# Patient Record
Sex: MALE | Race: WHITE | NOT HISPANIC OR LATINO | Employment: OTHER | URBAN - METROPOLITAN AREA
[De-identification: names, ages, dates, MRNs, and addresses within clinical notes are randomized per-mention and may not be internally consistent; named-entity substitution may affect disease eponyms.]

---

## 2023-03-28 DIAGNOSIS — Z79.4 TYPE 2 DIABETES MELLITUS WITH OTHER SPECIFIED COMPLICATION, WITH LONG-TERM CURRENT USE OF INSULIN (MULTI): Primary | ICD-10-CM

## 2023-03-28 DIAGNOSIS — E11.69 TYPE 2 DIABETES MELLITUS WITH OTHER SPECIFIED COMPLICATION, WITH LONG-TERM CURRENT USE OF INSULIN (MULTI): Primary | ICD-10-CM

## 2023-03-28 RX ORDER — METFORMIN HYDROCHLORIDE 500 MG/1
500 TABLET, EXTENDED RELEASE ORAL DAILY
COMMUNITY
End: 2023-03-28 | Stop reason: SDUPTHER

## 2023-03-28 RX ORDER — METFORMIN HYDROCHLORIDE 500 MG/1
500 TABLET, EXTENDED RELEASE ORAL DAILY
Qty: 90 TABLET | Refills: 3 | Status: SHIPPED | OUTPATIENT
Start: 2023-03-28 | End: 2024-03-22 | Stop reason: SDUPTHER

## 2023-04-13 PROBLEM — L40.9 PSORIASIS: Status: ACTIVE | Noted: 2023-04-13

## 2023-04-13 PROBLEM — I48.0 PAROXYSMAL ATRIAL FIBRILLATION (MULTI): Status: ACTIVE | Noted: 2023-04-13

## 2023-04-13 PROBLEM — I25.10 CORONARY ARTERY DISEASE INVOLVING NATIVE CORONARY ARTERY OF NATIVE HEART WITHOUT ANGINA PECTORIS: Status: ACTIVE | Noted: 2023-04-13

## 2023-04-13 PROBLEM — R80.9: Status: ACTIVE | Noted: 2023-04-13

## 2023-04-13 PROBLEM — E78.5 HYPERLIPIDEMIA: Status: ACTIVE | Noted: 2023-04-13

## 2023-04-13 PROBLEM — E08.29: Status: ACTIVE | Noted: 2023-04-13

## 2023-04-13 PROBLEM — H93.19 TINNITUS: Status: ACTIVE | Noted: 2023-04-13

## 2023-04-13 PROBLEM — L02.212 BACK ABSCESS: Status: ACTIVE | Noted: 2023-04-13

## 2023-04-13 PROBLEM — N40.0 BENIGN PROSTATIC HYPERPLASIA: Status: ACTIVE | Noted: 2023-04-13

## 2023-04-13 PROBLEM — J32.9 SINUS INFECTION: Status: ACTIVE | Noted: 2023-04-13

## 2023-04-13 PROBLEM — I10 ESSENTIAL HYPERTENSION: Status: ACTIVE | Noted: 2023-04-13

## 2023-04-13 PROBLEM — R19.5 POSITIVE COLORECTAL CANCER SCREENING USING COLOGUARD TEST: Status: ACTIVE | Noted: 2023-04-13

## 2023-04-13 PROBLEM — E87.6 HYPOKALEMIA: Status: ACTIVE | Noted: 2023-04-13

## 2023-04-13 PROBLEM — H53.139 SUDDEN LOSS OF VISION: Status: ACTIVE | Noted: 2023-04-13

## 2023-04-13 PROBLEM — E78.9 DISORDER OF LIPOID METABOLISM: Status: ACTIVE | Noted: 2023-04-13

## 2023-04-13 PROBLEM — H90.3 SENSORINEURAL HEARING LOSS, BILATERAL: Status: ACTIVE | Noted: 2023-04-13

## 2023-04-13 RX ORDER — HYDROXYCHLOROQUINE SULFATE 200 MG/1
TABLET, FILM COATED ORAL 2 TIMES DAILY
COMMUNITY

## 2023-04-13 RX ORDER — APIXABAN 5 MG/1
1 TABLET, FILM COATED ORAL 2 TIMES DAILY
COMMUNITY
Start: 2020-05-18 | End: 2023-12-12 | Stop reason: SDUPTHER

## 2023-04-13 RX ORDER — DOCUSATE SODIUM 100 MG/1
1 CAPSULE, LIQUID FILLED ORAL 2 TIMES DAILY
COMMUNITY

## 2023-04-13 RX ORDER — FINASTERIDE 5 MG/1
1 TABLET, FILM COATED ORAL DAILY
COMMUNITY
Start: 2019-05-17 | End: 2023-05-25 | Stop reason: SDUPTHER

## 2023-04-13 RX ORDER — CLOBETASOL PROPIONATE 0.5 MG/G
CREAM TOPICAL
COMMUNITY

## 2023-04-13 RX ORDER — SIMVASTATIN 40 MG/1
1 TABLET, FILM COATED ORAL NIGHTLY
COMMUNITY
Start: 2019-04-23 | End: 2024-02-20 | Stop reason: SDUPTHER

## 2023-04-13 RX ORDER — METOPROLOL SUCCINATE 25 MG/1
1 TABLET, EXTENDED RELEASE ORAL DAILY
COMMUNITY
Start: 2019-12-06 | End: 2023-12-29 | Stop reason: SDUPTHER

## 2023-04-13 RX ORDER — CLOBETASOL PROPIONATE 0.5 MG/ML
LOTION TOPICAL
COMMUNITY
Start: 2022-04-20 | End: 2023-08-11 | Stop reason: SDUPTHER

## 2023-04-13 RX ORDER — ACETAMINOPHEN 325 MG/1
TABLET ORAL EVERY 6 HOURS
COMMUNITY

## 2023-04-13 RX ORDER — NAPROXEN SODIUM 220 MG/1
TABLET, FILM COATED ORAL
COMMUNITY

## 2023-04-13 RX ORDER — TAMSULOSIN HYDROCHLORIDE 0.4 MG/1
0.4 CAPSULE ORAL 2 TIMES DAILY
COMMUNITY
End: 2023-08-11 | Stop reason: SDUPTHER

## 2023-04-13 RX ORDER — LOSARTAN POTASSIUM AND HYDROCHLOROTHIAZIDE 25; 100 MG/1; MG/1
1 TABLET ORAL DAILY
COMMUNITY
Start: 2019-05-17 | End: 2024-03-25

## 2023-04-17 ENCOUNTER — LAB (OUTPATIENT)
Dept: LAB | Facility: LAB | Age: 83
End: 2023-04-17
Payer: MEDICARE

## 2023-04-17 ENCOUNTER — OFFICE VISIT (OUTPATIENT)
Dept: PRIMARY CARE | Facility: CLINIC | Age: 83
End: 2023-04-17
Payer: MEDICARE

## 2023-04-17 VITALS
HEIGHT: 67 IN | BODY MASS INDEX: 28.25 KG/M2 | SYSTOLIC BLOOD PRESSURE: 122 MMHG | WEIGHT: 180 LBS | DIASTOLIC BLOOD PRESSURE: 64 MMHG

## 2023-04-17 DIAGNOSIS — N40.1 BENIGN PROSTATIC HYPERPLASIA WITH URINARY FREQUENCY: ICD-10-CM

## 2023-04-17 DIAGNOSIS — Z00.00 ROUTINE GENERAL MEDICAL EXAMINATION AT HEALTH CARE FACILITY: Primary | ICD-10-CM

## 2023-04-17 DIAGNOSIS — M05.79 RHEUMATOID ARTHRITIS INVOLVING MULTIPLE SITES WITH POSITIVE RHEUMATOID FACTOR (MULTI): ICD-10-CM

## 2023-04-17 DIAGNOSIS — R80.9: ICD-10-CM

## 2023-04-17 DIAGNOSIS — I48.0 PAROXYSMAL ATRIAL FIBRILLATION (MULTI): ICD-10-CM

## 2023-04-17 DIAGNOSIS — H90.3 SENSORINEURAL HEARING LOSS, BILATERAL: ICD-10-CM

## 2023-04-17 DIAGNOSIS — I73.9 PVD (PERIPHERAL VASCULAR DISEASE) (CMS-HCC): ICD-10-CM

## 2023-04-17 DIAGNOSIS — N40.1 BENIGN PROSTATIC HYPERPLASIA WITH LOWER URINARY TRACT SYMPTOMS, SYMPTOM DETAILS UNSPECIFIED: Primary | ICD-10-CM

## 2023-04-17 DIAGNOSIS — I25.10 CORONARY ARTERY DISEASE INVOLVING NATIVE CORONARY ARTERY OF NATIVE HEART WITHOUT ANGINA PECTORIS: ICD-10-CM

## 2023-04-17 DIAGNOSIS — R35.0 BENIGN PROSTATIC HYPERPLASIA WITH URINARY FREQUENCY: ICD-10-CM

## 2023-04-17 DIAGNOSIS — H93.13 TINNITUS OF BOTH EARS: ICD-10-CM

## 2023-04-17 DIAGNOSIS — E08.29: ICD-10-CM

## 2023-04-17 DIAGNOSIS — E78.41 ELEVATED LIPOPROTEIN(A): ICD-10-CM

## 2023-04-17 DIAGNOSIS — I10 ESSENTIAL HYPERTENSION: ICD-10-CM

## 2023-04-17 DIAGNOSIS — L40.9 PSORIASIS: ICD-10-CM

## 2023-04-17 DIAGNOSIS — E78.9 DISORDER OF LIPOID METABOLISM: ICD-10-CM

## 2023-04-17 LAB — PROSTATE SPECIFIC AG (NG/ML) IN SER/PLAS: 0.52 NG/ML (ref 0–4)

## 2023-04-17 PROCEDURE — G0439 PPPS, SUBSEQ VISIT: HCPCS | Performed by: FAMILY MEDICINE

## 2023-04-17 PROCEDURE — 84153 ASSAY OF PSA TOTAL: CPT

## 2023-04-17 PROCEDURE — 3078F DIAST BP <80 MM HG: CPT | Performed by: FAMILY MEDICINE

## 2023-04-17 PROCEDURE — 1160F RVW MEDS BY RX/DR IN RCRD: CPT | Performed by: FAMILY MEDICINE

## 2023-04-17 PROCEDURE — 3074F SYST BP LT 130 MM HG: CPT | Performed by: FAMILY MEDICINE

## 2023-04-17 PROCEDURE — 36415 COLL VENOUS BLD VENIPUNCTURE: CPT

## 2023-04-17 PROCEDURE — 1157F ADVNC CARE PLAN IN RCRD: CPT | Performed by: FAMILY MEDICINE

## 2023-04-17 PROCEDURE — 1159F MED LIST DOCD IN RCRD: CPT | Performed by: FAMILY MEDICINE

## 2023-04-17 RX ORDER — DEXTROSE 4 G
TABLET,CHEWABLE ORAL
Qty: 1 EACH | Refills: 0 | Status: SHIPPED | OUTPATIENT
Start: 2023-04-17 | End: 2024-04-16

## 2023-04-17 ASSESSMENT — ENCOUNTER SYMPTOMS
RESPIRATORY NEGATIVE: 1
WEAKNESS: 1
JOINT SWELLING: 1
ARTHRALGIAS: 1
ENDOCRINE NEGATIVE: 1
HEMATOLOGIC/LYMPHATIC NEGATIVE: 1
CONSTITUTIONAL NEGATIVE: 1
ALLERGIC/IMMUNOLOGIC NEGATIVE: 1
PSYCHIATRIC NEGATIVE: 1
FREQUENCY: 1
CARDIOVASCULAR NEGATIVE: 1
GASTROINTESTINAL NEGATIVE: 1

## 2023-04-17 NOTE — PATIENT INSTRUCTIONS
Contd meds , diet , daily X's FUWOD's . Oph/ pod, Labs reviewed, tcb  x 1wk  , rto x 4m had all shots ,

## 2023-04-17 NOTE — PROGRESS NOTES
"Subjective   Reason for Visit: Artis Horowitz is an 82 y.o. male here for a Medicare Wellness visit.          Reviewed all medications by prescribing practitioner or clinical pharmacist (such as prescriptions, OTCs, herbal therapies and supplements) and documented in the medical record.    RO        Patient Care Team:  Susan Valera MD as PCP - General  Susan Valera MD as PCP - [facility] Attributed Provider     Review of Systems   Constitutional: Negative.    HENT:  Positive for hearing loss and postnasal drip.    Eyes:  Positive for visual disturbance.   Respiratory: Negative.     Cardiovascular: Negative.    Gastrointestinal: Negative.    Endocrine: Negative.    Genitourinary:  Positive for frequency and urgency.   Musculoskeletal:  Positive for arthralgias and joint swelling.   Skin:  Positive for rash (psorasis).   Allergic/Immunologic: Negative.    Neurological:  Positive for weakness.   Hematological: Negative.    Psychiatric/Behavioral: Negative.         Objective   Vitals:  /64   Ht 1.702 m (5' 7\")   Wt 81.6 kg (180 lb)   BMI 28.19 kg/m²           Assessment/Plan   Problem List Items Addressed This Visit    None  Visit Diagnoses       Routine general medical examination at health care facility    -  Primary                 "

## 2023-04-21 RX ORDER — TAMSULOSIN HYDROCHLORIDE 0.4 MG/1
0.4 CAPSULE ORAL DAILY
Qty: 30 CAPSULE | Refills: 11 | Status: SHIPPED | OUTPATIENT
Start: 2023-04-21 | End: 2024-03-22 | Stop reason: SDUPTHER

## 2023-05-25 DIAGNOSIS — N40.0 BENIGN PROSTATIC HYPERPLASIA, UNSPECIFIED WHETHER LOWER URINARY TRACT SYMPTOMS PRESENT: ICD-10-CM

## 2023-05-25 RX ORDER — FINASTERIDE 5 MG/1
5 TABLET, FILM COATED ORAL DAILY
Qty: 90 TABLET | Refills: 3 | Status: SHIPPED | OUTPATIENT
Start: 2023-05-25 | End: 2024-05-21 | Stop reason: SDUPTHER

## 2023-08-07 LAB
ALANINE AMINOTRANSFERASE (SGPT) (U/L) IN SER/PLAS: 9 U/L (ref 10–52)
ALBUMIN (G/DL) IN SER/PLAS: 3.8 G/DL (ref 3.4–5)
ALKALINE PHOSPHATASE (U/L) IN SER/PLAS: 76 U/L (ref 33–136)
ANION GAP IN SER/PLAS: 12 MMOL/L (ref 10–20)
ASPARTATE AMINOTRANSFERASE (SGOT) (U/L) IN SER/PLAS: 12 U/L (ref 9–39)
BILIRUBIN TOTAL (MG/DL) IN SER/PLAS: 0.4 MG/DL (ref 0–1.2)
CALCIUM (MG/DL) IN SER/PLAS: 8.7 MG/DL (ref 8.6–10.3)
CARBON DIOXIDE, TOTAL (MMOL/L) IN SER/PLAS: 28 MMOL/L (ref 21–32)
CHLORIDE (MMOL/L) IN SER/PLAS: 103 MMOL/L (ref 98–107)
CREATININE (MG/DL) IN SER/PLAS: 1.11 MG/DL (ref 0.5–1.3)
ERYTHROCYTE DISTRIBUTION WIDTH (RATIO) BY AUTOMATED COUNT: 12.9 % (ref 11.5–14.5)
ERYTHROCYTE MEAN CORPUSCULAR HEMOGLOBIN CONCENTRATION (G/DL) BY AUTOMATED: 31.1 G/DL (ref 32–36)
ERYTHROCYTE MEAN CORPUSCULAR VOLUME (FL) BY AUTOMATED COUNT: 98 FL (ref 80–100)
ERYTHROCYTES (10*6/UL) IN BLOOD BY AUTOMATED COUNT: 3.76 X10E12/L (ref 4.5–5.9)
GFR MALE: 66 ML/MIN/1.73M2
GLUCOSE (MG/DL) IN SER/PLAS: 115 MG/DL (ref 74–99)
HEMATOCRIT (%) IN BLOOD BY AUTOMATED COUNT: 36.7 % (ref 41–52)
HEMOGLOBIN (G/DL) IN BLOOD: 11.4 G/DL (ref 13.5–17.5)
LEUKOCYTES (10*3/UL) IN BLOOD BY AUTOMATED COUNT: 9 X10E9/L (ref 4.4–11.3)
PLATELETS (10*3/UL) IN BLOOD AUTOMATED COUNT: 246 X10E9/L (ref 150–450)
POTASSIUM (MMOL/L) IN SER/PLAS: 4 MMOL/L (ref 3.5–5.3)
PROTEIN TOTAL: 6.3 G/DL (ref 6.4–8.2)
SEDIMENTATION RATE, ERYTHROCYTE: 13 MM/H (ref 0–20)
SODIUM (MMOL/L) IN SER/PLAS: 139 MMOL/L (ref 136–145)
UREA NITROGEN (MG/DL) IN SER/PLAS: 28 MG/DL (ref 6–23)

## 2023-08-11 ENCOUNTER — OFFICE VISIT (OUTPATIENT)
Dept: PRIMARY CARE | Facility: CLINIC | Age: 83
End: 2023-08-11
Payer: MEDICARE

## 2023-08-11 VITALS
HEART RATE: 80 BPM | HEIGHT: 68 IN | TEMPERATURE: 98.1 F | OXYGEN SATURATION: 98 % | SYSTOLIC BLOOD PRESSURE: 144 MMHG | RESPIRATION RATE: 18 BRPM | WEIGHT: 181 LBS | DIASTOLIC BLOOD PRESSURE: 68 MMHG | BODY MASS INDEX: 27.43 KG/M2

## 2023-08-11 DIAGNOSIS — R35.0 BENIGN PROSTATIC HYPERPLASIA WITH URINARY FREQUENCY: ICD-10-CM

## 2023-08-11 DIAGNOSIS — N40.1 BENIGN PROSTATIC HYPERPLASIA WITH URINARY FREQUENCY: ICD-10-CM

## 2023-08-11 DIAGNOSIS — E78.41 ELEVATED LIPOPROTEIN(A): ICD-10-CM

## 2023-08-11 DIAGNOSIS — R80.9: ICD-10-CM

## 2023-08-11 DIAGNOSIS — R35.0 URINARY FREQUENCY: ICD-10-CM

## 2023-08-11 DIAGNOSIS — M05.79 RHEUMATOID ARTHRITIS INVOLVING MULTIPLE SITES WITH POSITIVE RHEUMATOID FACTOR (MULTI): ICD-10-CM

## 2023-08-11 DIAGNOSIS — E08.29: ICD-10-CM

## 2023-08-11 DIAGNOSIS — I48.0 PAROXYSMAL ATRIAL FIBRILLATION (MULTI): Primary | ICD-10-CM

## 2023-08-11 DIAGNOSIS — L72.3 SEBACEOUS CYST: ICD-10-CM

## 2023-08-11 DIAGNOSIS — L40.9 PSORIASIS: ICD-10-CM

## 2023-08-11 DIAGNOSIS — I10 ESSENTIAL HYPERTENSION: ICD-10-CM

## 2023-08-11 DIAGNOSIS — E11.9 TYPE 2 DIABETES MELLITUS WITHOUT COMPLICATION, UNSPECIFIED WHETHER LONG TERM INSULIN USE (MULTI): ICD-10-CM

## 2023-08-11 PROBLEM — E87.6 HYPOKALEMIA: Status: RESOLVED | Noted: 2023-04-13 | Resolved: 2023-08-11

## 2023-08-11 PROBLEM — H93.19 TINNITUS: Status: RESOLVED | Noted: 2023-04-13 | Resolved: 2023-08-11

## 2023-08-11 PROBLEM — H90.3 SENSORINEURAL HEARING LOSS, BILATERAL: Status: RESOLVED | Noted: 2023-04-13 | Resolved: 2023-08-11

## 2023-08-11 PROBLEM — H53.139 SUDDEN LOSS OF VISION: Status: RESOLVED | Noted: 2023-04-13 | Resolved: 2023-08-11

## 2023-08-11 PROBLEM — L02.212 BACK ABSCESS: Status: RESOLVED | Noted: 2023-04-13 | Resolved: 2023-08-11

## 2023-08-11 PROBLEM — J32.9 SINUS INFECTION: Status: RESOLVED | Noted: 2023-04-13 | Resolved: 2023-08-11

## 2023-08-11 PROBLEM — R19.5 POSITIVE COLORECTAL CANCER SCREENING USING COLOGUARD TEST: Status: RESOLVED | Noted: 2023-04-13 | Resolved: 2023-08-11

## 2023-08-11 PROBLEM — E78.9 DISORDER OF LIPOID METABOLISM: Status: RESOLVED | Noted: 2023-04-13 | Resolved: 2023-08-11

## 2023-08-11 LAB
POC APPEARANCE, URINE: CLEAR
POC BILIRUBIN, URINE: NEGATIVE
POC BLOOD, URINE: NEGATIVE
POC COLOR, URINE: YELLOW
POC GLUCOSE, URINE: NEGATIVE MG/DL
POC KETONES, URINE: NEGATIVE MG/DL
POC LEUKOCYTES, URINE: NEGATIVE
POC NITRITE,URINE: NEGATIVE
POC PH, URINE: 5 PH
POC PROTEIN, URINE: NEGATIVE MG/DL
POC SPECIFIC GRAVITY, URINE: 1.01
POC UROBILINOGEN, URINE: 0.2 EU/DL

## 2023-08-11 PROCEDURE — 1157F ADVNC CARE PLAN IN RCRD: CPT | Performed by: INTERNAL MEDICINE

## 2023-08-11 PROCEDURE — 3077F SYST BP >= 140 MM HG: CPT | Performed by: INTERNAL MEDICINE

## 2023-08-11 PROCEDURE — 1159F MED LIST DOCD IN RCRD: CPT | Performed by: INTERNAL MEDICINE

## 2023-08-11 PROCEDURE — 3078F DIAST BP <80 MM HG: CPT | Performed by: INTERNAL MEDICINE

## 2023-08-11 PROCEDURE — 1160F RVW MEDS BY RX/DR IN RCRD: CPT | Performed by: INTERNAL MEDICINE

## 2023-08-11 PROCEDURE — 87086 URINE CULTURE/COLONY COUNT: CPT

## 2023-08-11 PROCEDURE — 1036F TOBACCO NON-USER: CPT | Performed by: INTERNAL MEDICINE

## 2023-08-11 PROCEDURE — 1126F AMNT PAIN NOTED NONE PRSNT: CPT | Performed by: INTERNAL MEDICINE

## 2023-08-11 PROCEDURE — 81002 URINALYSIS NONAUTO W/O SCOPE: CPT | Performed by: INTERNAL MEDICINE

## 2023-08-11 PROCEDURE — 81001 URINALYSIS AUTO W/SCOPE: CPT

## 2023-08-11 PROCEDURE — 99214 OFFICE O/P EST MOD 30 MIN: CPT | Performed by: INTERNAL MEDICINE

## 2023-08-11 RX ORDER — FLUOCINOLONE ACETONIDE 0.11 MG/ML
OIL TOPICAL
COMMUNITY
Start: 2023-04-13

## 2023-08-11 RX ORDER — LANCETS 33 GAUGE
EACH MISCELLANEOUS
COMMUNITY
Start: 2023-04-24

## 2023-08-11 RX ORDER — BLOOD SUGAR DIAGNOSTIC
STRIP MISCELLANEOUS
COMMUNITY
Start: 2023-02-06

## 2023-08-11 RX ORDER — HALOBETASOL PROPIONATE 0.5 MG/G
CREAM TOPICAL
COMMUNITY
Start: 2023-04-13

## 2023-08-11 ASSESSMENT — PATIENT HEALTH QUESTIONNAIRE - PHQ9
SUM OF ALL RESPONSES TO PHQ9 QUESTIONS 1 AND 2: 0
1. LITTLE INTEREST OR PLEASURE IN DOING THINGS: NOT AT ALL
2. FEELING DOWN, DEPRESSED OR HOPELESS: NOT AT ALL

## 2023-08-11 ASSESSMENT — PAIN SCALES - GENERAL: PAINLEVEL: 0-NO PAIN

## 2023-08-11 NOTE — PROGRESS NOTES
"Subjective   Artis Horowitz is a 83 y.o. male who presents for Establish Care.    HPI   NP to establish    H/o Type 2 DM.  Monitors glucose in Am.  Ave: 100-120  Denies neuropathy, polydipsia.  C/o polyuria, urgency over past few months.  Denies dysuria.    H/o HTN, Afib.  Denies adverse sx's r/t HTN.  Sees Dr. Arnett.      Review of Systems   Constitutional:  Negative for fatigue and fever.   Respiratory:  Negative for cough and shortness of breath.    Cardiovascular:  Negative for chest pain and leg swelling.   All other systems reviewed and are negative.      Health Maintenance Due   Topic Date Due    Diabetes: Foot Exam  Never done    DTaP/Tdap/Td Vaccines (1 - Tdap) Never done    Zoster Vaccines (2 of 3) 08/03/2011    Diabetes: Retinopathy Screening  07/12/2022    COVID-19 Vaccine (4 - Booster for Pfizer series) 12/28/2022    Pneumococcal Vaccine: 65+ Years (2 - PCV) 04/14/2023    Diabetes: Hemoglobin A1C  04/27/2023       Objective   /68   Pulse 80   Temp 36.7 °C (98.1 °F)   Resp 18   Ht 1.727 m (5' 8\")   Wt 82.1 kg (181 lb)   SpO2 98%   BMI 27.52 kg/m²     Physical Exam  Vitals and nursing note reviewed.   Constitutional:       Appearance: Normal appearance.   HENT:      Head: Normocephalic.   Eyes:      Conjunctiva/sclera: Conjunctivae normal.      Pupils: Pupils are equal, round, and reactive to light.   Cardiovascular:      Rate and Rhythm: Normal rate and regular rhythm.      Pulses: Normal pulses.      Heart sounds: Normal heart sounds.   Pulmonary:      Effort: Pulmonary effort is normal.      Breath sounds: Normal breath sounds.   Musculoskeletal:         General: No swelling.      Cervical back: Neck supple.   Skin:     General: Skin is warm and dry.   Neurological:      General: No focal deficit present.      Mental Status: He is oriented to person, place, and time.         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)     Other Visit Diagnoses       Type 2 diabetes mellitus without complication, unspecified whether long term insulin use (CMS/HCC)        Relevant Orders    Lipid Panel    CBC    Comprehensive Metabolic Panel    Hemoglobin A1C    Vitamin B12    Urinary frequency        Relevant Orders    Urinalysis with Reflex Microscopic (Completed)    Urine Culture (Completed)    Referral to Urology    POCT UA (nonautomated) manually resulted (Completed)    Urinalysis Microscopic Only (Completed)    Sebaceous cyst        Relevant Orders    Referral to General Surgery          Reviewed records  Cont meds  Check labs in 4 months  Refer to surgery  Refer to urology  Stable based on symptoms and exam.  Continue established treatment plan and follow up at least yearly.  See scanned doc   Check urine  Fu in 4 months

## 2023-08-12 LAB
APPEARANCE, URINE: CLEAR
BILIRUBIN, URINE: NEGATIVE
BLOOD, URINE: NEGATIVE
COLOR, URINE: YELLOW
GLUCOSE, URINE: NEGATIVE MG/DL
KETONES, URINE: NEGATIVE MG/DL
LEUKOCYTE ESTERASE, URINE: ABNORMAL
NITRITE, URINE: NEGATIVE
PH, URINE: 6 (ref 5–8)
PROTEIN, URINE: NEGATIVE MG/DL
RBC, URINE: NORMAL /HPF (ref 0–5)
SPECIFIC GRAVITY, URINE: 1.01 (ref 1–1.03)
SQUAMOUS EPITHELIAL CELLS, URINE: <1 /HPF
UROBILINOGEN, URINE: <2 MG/DL (ref 0–1.9)
WBC, URINE: 1 /HPF (ref 0–5)

## 2023-08-13 LAB — URINE CULTURE: NORMAL

## 2023-08-14 ASSESSMENT — ENCOUNTER SYMPTOMS
SHORTNESS OF BREATH: 0
FATIGUE: 0
COUGH: 0
FEVER: 0

## 2023-10-14 DIAGNOSIS — N40.1 BENIGN PROSTATIC HYPERPLASIA WITH URINARY FREQUENCY: ICD-10-CM

## 2023-10-14 DIAGNOSIS — R35.0 BENIGN PROSTATIC HYPERPLASIA WITH URINARY FREQUENCY: ICD-10-CM

## 2023-10-16 ENCOUNTER — LAB (OUTPATIENT)
Dept: LAB | Facility: LAB | Age: 83
End: 2023-10-16
Payer: MEDICARE

## 2023-10-16 DIAGNOSIS — R35.0 BENIGN PROSTATIC HYPERPLASIA WITH URINARY FREQUENCY: ICD-10-CM

## 2023-10-16 DIAGNOSIS — N40.1 BENIGN PROSTATIC HYPERPLASIA WITH URINARY FREQUENCY: ICD-10-CM

## 2023-10-16 LAB
APPEARANCE UR: CLEAR
BILIRUB UR STRIP.AUTO-MCNC: NEGATIVE MG/DL
COLOR UR: YELLOW
GLUCOSE UR STRIP.AUTO-MCNC: NEGATIVE MG/DL
KETONES UR STRIP.AUTO-MCNC: NEGATIVE MG/DL
LEUKOCYTE ESTERASE UR QL STRIP.AUTO: NEGATIVE
NITRITE UR QL STRIP.AUTO: NEGATIVE
PH UR STRIP.AUTO: 7 [PH]
PROT UR STRIP.AUTO-MCNC: NEGATIVE MG/DL
PSA SERPL-MCNC: 0.47 NG/ML
RBC # UR STRIP.AUTO: NEGATIVE /UL
SP GR UR STRIP.AUTO: 1.01
UROBILINOGEN UR STRIP.AUTO-MCNC: <2 MG/DL

## 2023-10-16 PROCEDURE — 87086 URINE CULTURE/COLONY COUNT: CPT

## 2023-10-16 PROCEDURE — 36415 COLL VENOUS BLD VENIPUNCTURE: CPT

## 2023-10-16 PROCEDURE — 84153 ASSAY OF PSA TOTAL: CPT

## 2023-10-16 PROCEDURE — 81003 URINALYSIS AUTO W/O SCOPE: CPT

## 2023-10-16 PROCEDURE — 88112 CYTOPATH CELL ENHANCE TECH: CPT

## 2023-10-17 ENCOUNTER — OFFICE VISIT (OUTPATIENT)
Dept: UROLOGY | Facility: CLINIC | Age: 83
End: 2023-10-17
Payer: MEDICARE

## 2023-10-17 VITALS
RESPIRATION RATE: 16 BRPM | HEIGHT: 68 IN | BODY MASS INDEX: 28.07 KG/M2 | WEIGHT: 185.19 LBS | TEMPERATURE: 97.2 F | DIASTOLIC BLOOD PRESSURE: 74 MMHG | HEART RATE: 89 BPM | SYSTOLIC BLOOD PRESSURE: 132 MMHG

## 2023-10-17 DIAGNOSIS — N40.1 BENIGN PROSTATIC HYPERPLASIA WITH URINARY FREQUENCY: ICD-10-CM

## 2023-10-17 DIAGNOSIS — R35.1 BENIGN PROSTATIC HYPERPLASIA WITH NOCTURIA: Primary | ICD-10-CM

## 2023-10-17 DIAGNOSIS — N40.1 BENIGN PROSTATIC HYPERPLASIA WITH NOCTURIA: Primary | ICD-10-CM

## 2023-10-17 DIAGNOSIS — R35.0 BENIGN PROSTATIC HYPERPLASIA WITH URINARY FREQUENCY: ICD-10-CM

## 2023-10-17 LAB — BACTERIA UR CULT: NO GROWTH

## 2023-10-17 PROCEDURE — 3078F DIAST BP <80 MM HG: CPT | Performed by: UROLOGY

## 2023-10-17 PROCEDURE — 1160F RVW MEDS BY RX/DR IN RCRD: CPT | Performed by: UROLOGY

## 2023-10-17 PROCEDURE — 3075F SYST BP GE 130 - 139MM HG: CPT | Performed by: UROLOGY

## 2023-10-17 PROCEDURE — 99203 OFFICE O/P NEW LOW 30 MIN: CPT | Performed by: UROLOGY

## 2023-10-17 PROCEDURE — 1159F MED LIST DOCD IN RCRD: CPT | Performed by: UROLOGY

## 2023-10-17 PROCEDURE — 1126F AMNT PAIN NOTED NONE PRSNT: CPT | Performed by: UROLOGY

## 2023-10-17 PROCEDURE — 1036F TOBACCO NON-USER: CPT | Performed by: UROLOGY

## 2023-10-17 ASSESSMENT — ENCOUNTER SYMPTOMS
FREQUENCY: 0
DIFFICULTY URINATING: 0
FLANK PAIN: 0
DYSURIA: 0
HEMATURIA: 0

## 2023-10-17 ASSESSMENT — PAIN SCALES - GENERAL: PAINLEVEL: 0-NO PAIN

## 2023-10-17 NOTE — PATIENT INSTRUCTIONS
It was very nice to meet you today.  We had a full discussion regarding an enlarged prostate and the symptoms that are associated.  You are now complaining mostly of urinary urgency.  You are presently taking Flomax and Proscar and it appears to no longer have the same effectiveness.  We will proceed with a cystoscopy and flow studies to further investigate.  You may require minimally invasive prostate surgery or you may require a special medicine to treat your overactive bladder.     This note was created with voice-recognition software and was not corrected for typographical or grammatical errors.

## 2023-10-17 NOTE — LETTER
"October 17, 2023     Nicolette Galeana DO  [address]  [place] [address]    Patient: Artis Horowitz   YOB: 1940   Date of Visit: 10/17/2023       Dear Dr. Nicolette Galeana, DO:    Thank you for referring Artis Horowitz to me for evaluation. Below are my notes for this consultation.  If you have questions, please do not hesitate to call me. I look forward to following your patient along with you.       Sincerely,     Demetrio Khan MD      CC: No Recipients  ______________________________________________________________________________________    83-year-old white male referred by Dr. Nicolette Galeana for \"BPH, urinary urgency and polyuria.  Also sees Dr. Nate Arnett for cardiac disease.  History of NIDDM.  Patient was previously in management.  Now retired.  No family history of prostate or breast cancer.  Was only on Flomax and Proscar.  Has never smoked cigarettes.    October 17, 2023, patient arrives alone.  He has #1 complaint is urinary urgency.  He is on Flomax and Proscar now.  He does have diabetes.  This may either be secondary to his BPH with LUTS or possibly diabetic neuropathy.  He will proceed with a cystoscopy, flow studies and a discussion of treatment options.  We will determine whether he needs minimally invasive prostate surgery or an antimuscarinic.  He will also obtain urine studies and a PSA with renal and bladder ultrasound prior to that visit.    PLAN:    1.  The patient will return for cystoscopy, flow studies and a discussion of treatment options.    2.  Continue on Flomax 0.4 mg and Proscar 5 mg both p.o. daily    Physical Exam  Vitals and nursing note reviewed.   Constitutional:       Appearance: Normal appearance.   HENT:      Head: Normocephalic.   Pulmonary:      Effort: Pulmonary effort is normal.   Abdominal:      Palpations: Abdomen is soft.      Tenderness: There is no abdominal tenderness.   Musculoskeletal:         General: Normal range of motion.      " Cervical back: Normal range of motion and neck supple.   Neurological:      General: No focal deficit present.      Mental Status: He is alert.   Psychiatric:         Mood and Affect: Mood normal.        This note was created with voice-recognition software and was not corrected for typographical or grammatical errors.

## 2023-10-17 NOTE — PROGRESS NOTES
"83-year-old white male referred by Dr. Nicolette Galeana for \"BPH, urinary urgency and polyuria.  Also sees Dr. Nate Arnett for cardiac disease.  History of NIDDM.  Patient was previously in management.  Now retired.  No family history of prostate or breast cancer.  Was only on Flomax and Proscar.  Has never smoked cigarettes.    October 17, 2023, patient arrives alone.  He has #1 complaint is urinary urgency.  He is on Flomax and Proscar now.  He does have diabetes.  This may either be secondary to his BPH with LUTS or possibly diabetic neuropathy.  He will proceed with a cystoscopy, flow studies and a discussion of treatment options.  We will determine whether he needs minimally invasive prostate surgery or an antimuscarinic.  He will also obtain urine studies and a PSA with renal and bladder ultrasound prior to that visit.    PLAN:    1.  The patient will return for cystoscopy, flow studies and a discussion of treatment options.    2.  Continue on Flomax 0.4 mg and Proscar 5 mg both p.o. daily    Physical Exam  Vitals and nursing note reviewed.   Constitutional:       Appearance: Normal appearance.   HENT:      Head: Normocephalic.   Pulmonary:      Effort: Pulmonary effort is normal.   Abdominal:      Palpations: Abdomen is soft.      Tenderness: There is no abdominal tenderness.   Musculoskeletal:         General: Normal range of motion.      Cervical back: Normal range of motion and neck supple.   Neurological:      General: No focal deficit present.      Mental Status: He is alert.   Psychiatric:         Mood and Affect: Mood normal.        This note was created with voice-recognition software and was not corrected for typographical or grammatical errors.   "

## 2023-10-17 NOTE — PROGRESS NOTES
Patient denies any pain today. Patient has not had any recent surgeries or hospital admits. Patient denies any concerns about falling or safety. Patient has concerns for urgency. Patient taking Flomax and Proscar daily. CV        Review of Systems   Genitourinary:  Positive for urgency. Negative for difficulty urinating, dysuria, enuresis, flank pain, frequency and hematuria.   All other systems reviewed and are negative.

## 2023-10-18 ENCOUNTER — OFFICE VISIT (OUTPATIENT)
Dept: SURGERY | Facility: CLINIC | Age: 83
End: 2023-10-18
Payer: MEDICARE

## 2023-10-18 VITALS — BODY MASS INDEX: 28.13 KG/M2 | WEIGHT: 185 LBS

## 2023-10-18 DIAGNOSIS — M79.89 SOFT TISSUE MASS: ICD-10-CM

## 2023-10-18 DIAGNOSIS — L72.0 EPIDERMOID CYST OF SKIN OF BACK: Primary | ICD-10-CM

## 2023-10-18 LAB
LABORATORY COMMENT REPORT: NORMAL
LABORATORY COMMENT REPORT: NORMAL
PATH REPORT.FINAL DX SPEC: NORMAL
PATH REPORT.GROSS SPEC: NORMAL
PATH REPORT.RELEVANT HX SPEC: NORMAL
PATH REPORT.TOTAL CANCER: NORMAL
TEST COMMENT - SURGICAL SENDOUT REQUEST: NORMAL

## 2023-10-18 PROCEDURE — 1126F AMNT PAIN NOTED NONE PRSNT: CPT | Performed by: SURGERY

## 2023-10-18 PROCEDURE — 88304 TISSUE EXAM BY PATHOLOGIST: CPT | Performed by: PATHOLOGY

## 2023-10-18 PROCEDURE — 1160F RVW MEDS BY RX/DR IN RCRD: CPT | Performed by: SURGERY

## 2023-10-18 PROCEDURE — 99214 OFFICE O/P EST MOD 30 MIN: CPT | Performed by: SURGERY

## 2023-10-18 PROCEDURE — 0752T DGTZ GLS MCRSCP SLD LVL III: CPT | Mod: TC,SUR | Performed by: SURGERY

## 2023-10-18 PROCEDURE — 1036F TOBACCO NON-USER: CPT | Performed by: SURGERY

## 2023-10-18 PROCEDURE — 1159F MED LIST DOCD IN RCRD: CPT | Performed by: SURGERY

## 2023-10-20 ENCOUNTER — HOSPITAL ENCOUNTER (OUTPATIENT)
Dept: RADIOLOGY | Facility: HOSPITAL | Age: 83
Discharge: HOME | End: 2023-10-20
Payer: MEDICARE

## 2023-10-20 DIAGNOSIS — N40.1 BENIGN PROSTATIC HYPERPLASIA WITH URINARY FREQUENCY: ICD-10-CM

## 2023-10-20 DIAGNOSIS — R35.0 BENIGN PROSTATIC HYPERPLASIA WITH URINARY FREQUENCY: ICD-10-CM

## 2023-10-20 PROCEDURE — 76770 US EXAM ABDO BACK WALL COMP: CPT

## 2023-10-20 PROCEDURE — 76770 US EXAM ABDO BACK WALL COMP: CPT | Performed by: STUDENT IN AN ORGANIZED HEALTH CARE EDUCATION/TRAINING PROGRAM

## 2023-10-23 DIAGNOSIS — M79.89 SOFT TISSUE MASS: Primary | ICD-10-CM

## 2023-10-27 ENCOUNTER — OFFICE VISIT (OUTPATIENT)
Dept: SURGERY | Facility: CLINIC | Age: 83
End: 2023-10-27
Payer: MEDICARE

## 2023-10-27 DIAGNOSIS — L72.0 EPIDERMOID CYST OF SKIN OF BACK: Primary | ICD-10-CM

## 2023-10-27 PROCEDURE — 1126F AMNT PAIN NOTED NONE PRSNT: CPT | Performed by: SURGERY

## 2023-10-27 PROCEDURE — 99024 POSTOP FOLLOW-UP VISIT: CPT | Performed by: SURGERY

## 2023-10-27 PROCEDURE — 1036F TOBACCO NON-USER: CPT | Performed by: SURGERY

## 2023-10-27 PROCEDURE — 1160F RVW MEDS BY RX/DR IN RCRD: CPT | Performed by: SURGERY

## 2023-10-27 PROCEDURE — 1159F MED LIST DOCD IN RCRD: CPT | Performed by: SURGERY

## 2023-10-27 NOTE — PROGRESS NOTES
Subjective   Patient ID: Artis Horowitz is a 83 y.o. male who presents for office procedure.  HPI  History of sebaceous cyst of the back x3.  Prior history of sebaceous cyst with acute inflammation necessitating acute management and subsequent removal.  Patient on Eliquis.  Review of Systems   All other systems reviewed and are negative.      Family History   Problem Relation Name Age of Onset    Heart disease Mother      Arthritis Mother        Social History     Socioeconomic History    Marital status:      Spouse name: None    Number of children: None    Years of education: None    Highest education level: None   Occupational History    None   Tobacco Use    Smoking status: Never    Smokeless tobacco: Never   Substance and Sexual Activity    Alcohol use: Yes    Drug use: Not Currently    Sexual activity: None   Other Topics Concern    None   Social History Narrative    None     Social Determinants of Health     Financial Resource Strain: Not on file   Food Insecurity: Not on file   Transportation Needs: Not on file   Physical Activity: Not on file   Stress: Not on file   Social Connections: Not on file   Intimate Partner Violence: Not on file   Housing Stability: Not on file          Current Outpatient Medications:     Accu-Chek Beulah Plus test strp strip, USE ONE STRIP TO CHECK GLUCOSE ONCE DAILY, Disp: , Rfl:     acetaminophen (Tylenol) 325 mg tablet, Take by mouth every 6 hours., Disp: , Rfl:     aspirin 81 mg chewable tablet, Chew once daily., Disp: , Rfl:     clobetasol (Temovate) 0.05 % cream, APPLY TO AFFECTED AREAS TWICE DAILY AS NEEDED FOR FLARES, Disp: , Rfl:     docusate sodium (Colace) 100 mg capsule, Take 1 capsule (100 mg) by mouth 2 times a day., Disp: , Rfl:     Eliquis 5 mg tablet, Take 1 tablet (5 mg) by mouth 2 times a day., Disp: , Rfl:     finasteride (Proscar) 5 mg tablet, Take 1 tablet (5 mg) by mouth once daily., Disp: 90 tablet, Rfl: 3    fluocinolone (Derma-Smoothe) 0.01 %  external oil, APPLY TO THE AFFECTED AREA(S) DAILY, Disp: , Rfl:     halobetasol (UltraVATE) 0.05 % cream, to affected area, Disp: , Rfl:     hydroxychloroquine (Plaquenil) 200 mg tablet, Take by mouth twice a day., Disp: , Rfl:     losartan-hydrochlorothiazide (Hyzaar) 100-25 mg tablet, Take 1 tablet by mouth once daily., Disp: , Rfl:     metFORMIN XR (Glucophage-XR) 500 mg 24 hr tablet, Take 1 tablet (500 mg) by mouth once daily., Disp: 90 tablet, Rfl: 3    metoprolol succinate XL (Toprol-XL) 25 mg 24 hr tablet, Take 1 tablet (25 mg) by mouth once daily., Disp: , Rfl:     NON FORMULARY, Prevagen- Take 1 tab by mouth daily., Disp: , Rfl:     simvastatin (Zocor) 40 mg tablet, Take 1 tablet (40 mg) by mouth once daily at bedtime., Disp: , Rfl:     tamsulosin (Flomax) 0.4 mg 24 hr capsule, Take 1 capsule (0.4 mg) by mouth once daily., Disp: 30 capsule, Rfl: 11    blood-glucose meter misc, Use with test strips and lancets as directed to check blood sugar 2 times per day (Patient not taking: Reported on 10/18/2023), Disp: 1 each, Rfl: 0    Unilet Lancet 33 gauge misc, USE AS DIRECTED to check blood sugar ONCE DAILY, Disp: , Rfl:      Objective   There were no vitals filed for this visit.   Physical Exam  Constitutional: Alert, no acute distress  HEENT: Well-developed, anicteric  Neck: Supple  Chest: Unlabored respirations  Heart: Regular  Abdomen: Soft, nondistended, [] no masses [] appreciated  Extremities: Warm and well perfused, no edema.  Back sebaceous cyst times three 1 cm each with central pore, no drainage or inflammation.  Psychiatric: Oriented appropriately, mood and affect appropriate  Assessment/Plan     1. Epidermoid cyst of skin of back  ANATOMIC PATHOLOGY TEST REQUESTS      2. Soft tissue mass  Surgical Pathology Exam         Procedure note:  Informed consent obtained.  Area was marked.  Local anesthesia infiltrated x3 and elliptical incision was made encompassing the central pore each.  Dissection carried  down into the immediate subcutaneous space sharply and cyst x3 excised intact.  Notable bleeding consistent with patient's current anticoagulation controlled with cautery as well as pressure, suture ligation.  Incision was reapproximated with interrupted 3-0 Vicryl suture deeply and nylon mattress sutures interrupted at the level of the skin.  Hemostatic.  No hematoma or drainage seen at the conclusion of the procedure.  Dressing applied.  Tolerated well.  Renata Tim MD

## 2023-10-27 NOTE — PROGRESS NOTES
Subjective   Patient ID: Artis Horowitz is a 83 y.o. male who presents for Follow-up (Sutures removed).  HPI  1 week status post sebaceous cyst excision of the back x3.  Patient on Eliquis with notable bleeding during procedure controlled with cautery and pressure.  No complaints at the present time.    Presents for suture removal evaluation.  On exam, incisions all intact without cellulitis or seroma/hematoma formation.  Some central scabbing.  Review of Systems    Family History   Problem Relation Name Age of Onset    Heart disease Mother      Arthritis Mother        Social History     Socioeconomic History    Marital status:      Spouse name: Not on file    Number of children: Not on file    Years of education: Not on file    Highest education level: Not on file   Occupational History    Not on file   Tobacco Use    Smoking status: Never    Smokeless tobacco: Never   Substance and Sexual Activity    Alcohol use: Yes    Drug use: Not Currently    Sexual activity: Not on file   Other Topics Concern    Not on file   Social History Narrative    Not on file     Social Determinants of Health     Financial Resource Strain: Not on file   Food Insecurity: Not on file   Transportation Needs: Not on file   Physical Activity: Not on file   Stress: Not on file   Social Connections: Not on file   Intimate Partner Violence: Not on file   Housing Stability: Not on file          Current Outpatient Medications:     Accu-Chek Beulah Plus test strp strip, USE ONE STRIP TO CHECK GLUCOSE ONCE DAILY, Disp: , Rfl:     acetaminophen (Tylenol) 325 mg tablet, Take by mouth every 6 hours., Disp: , Rfl:     aspirin 81 mg chewable tablet, Chew once daily., Disp: , Rfl:     blood-glucose meter misc, Use with test strips and lancets as directed to check blood sugar 2 times per day (Patient not taking: Reported on 10/18/2023), Disp: 1 each, Rfl: 0    clobetasol (Temovate) 0.05 % cream, APPLY TO AFFECTED AREAS TWICE DAILY AS NEEDED FOR  FLARES, Disp: , Rfl:     docusate sodium (Colace) 100 mg capsule, Take 1 capsule (100 mg) by mouth 2 times a day., Disp: , Rfl:     Eliquis 5 mg tablet, Take 1 tablet (5 mg) by mouth 2 times a day., Disp: , Rfl:     finasteride (Proscar) 5 mg tablet, Take 1 tablet (5 mg) by mouth once daily., Disp: 90 tablet, Rfl: 3    fluocinolone (Derma-Smoothe) 0.01 % external oil, APPLY TO THE AFFECTED AREA(S) DAILY, Disp: , Rfl:     halobetasol (UltraVATE) 0.05 % cream, to affected area, Disp: , Rfl:     hydroxychloroquine (Plaquenil) 200 mg tablet, Take by mouth twice a day., Disp: , Rfl:     losartan-hydrochlorothiazide (Hyzaar) 100-25 mg tablet, Take 1 tablet by mouth once daily., Disp: , Rfl:     metFORMIN XR (Glucophage-XR) 500 mg 24 hr tablet, Take 1 tablet (500 mg) by mouth once daily., Disp: 90 tablet, Rfl: 3    metoprolol succinate XL (Toprol-XL) 25 mg 24 hr tablet, Take 1 tablet (25 mg) by mouth once daily., Disp: , Rfl:     NON FORMULARY, Prevagen- Take 1 tab by mouth daily., Disp: , Rfl:     simvastatin (Zocor) 40 mg tablet, Take 1 tablet (40 mg) by mouth once daily at bedtime., Disp: , Rfl:     tamsulosin (Flomax) 0.4 mg 24 hr capsule, Take 1 capsule (0.4 mg) by mouth once daily., Disp: 30 capsule, Rfl: 11    Unilet Lancet 33 gauge misc, USE AS DIRECTED to check blood sugar ONCE DAILY, Disp: , Rfl:      Objective   There were no vitals filed for this visit.   Physical Exam    Assessment/Plan   Problem List Items Addressed This Visit    None  Visit Diagnoses       Epidermoid cyst of skin of back    -  Primary          Plan for suture removal in 1 week, not ready at the present time  Renata Tim MD

## 2023-11-02 ENCOUNTER — OFFICE VISIT (OUTPATIENT)
Dept: SURGERY | Facility: CLINIC | Age: 83
End: 2023-11-02
Payer: MEDICARE

## 2023-11-02 VITALS
HEART RATE: 88 BPM | HEIGHT: 68 IN | DIASTOLIC BLOOD PRESSURE: 62 MMHG | SYSTOLIC BLOOD PRESSURE: 122 MMHG | BODY MASS INDEX: 28.13 KG/M2

## 2023-11-02 DIAGNOSIS — L72.0 EPIDERMOID CYST OF SKIN OF BACK: Primary | ICD-10-CM

## 2023-11-02 PROCEDURE — 1159F MED LIST DOCD IN RCRD: CPT | Performed by: SURGERY

## 2023-11-02 PROCEDURE — 3078F DIAST BP <80 MM HG: CPT | Performed by: SURGERY

## 2023-11-02 PROCEDURE — 3074F SYST BP LT 130 MM HG: CPT | Performed by: SURGERY

## 2023-11-02 PROCEDURE — 99024 POSTOP FOLLOW-UP VISIT: CPT | Performed by: SURGERY

## 2023-11-02 PROCEDURE — 1126F AMNT PAIN NOTED NONE PRSNT: CPT | Performed by: SURGERY

## 2023-11-02 PROCEDURE — 1036F TOBACCO NON-USER: CPT | Performed by: SURGERY

## 2023-11-02 PROCEDURE — 1160F RVW MEDS BY RX/DR IN RCRD: CPT | Performed by: SURGERY

## 2023-11-02 NOTE — PROGRESS NOTES
Patient status post excision of back mass here for suture removal no complaints    Incisions healed sutures removed in their entirety    Follow-up as needed

## 2023-11-08 LAB
LABORATORY COMMENT REPORT: NORMAL
PATH REPORT.FINAL DX SPEC: NORMAL
PATH REPORT.GROSS SPEC: NORMAL
PATH REPORT.RELEVANT HX SPEC: NORMAL
PATH REPORT.TOTAL CANCER: NORMAL

## 2023-12-04 ENCOUNTER — LAB (OUTPATIENT)
Dept: LAB | Facility: LAB | Age: 83
End: 2023-12-04
Payer: MEDICARE

## 2023-12-04 DIAGNOSIS — E11.9 TYPE 2 DIABETES MELLITUS WITHOUT COMPLICATION, UNSPECIFIED WHETHER LONG TERM INSULIN USE (MULTI): ICD-10-CM

## 2023-12-04 LAB
ALBUMIN SERPL BCP-MCNC: 3.9 G/DL (ref 3.4–5)
ALP SERPL-CCNC: 78 U/L (ref 33–136)
ALT SERPL W P-5'-P-CCNC: 10 U/L (ref 10–52)
ANION GAP SERPL CALC-SCNC: 11 MMOL/L (ref 10–20)
AST SERPL W P-5'-P-CCNC: 12 U/L (ref 9–39)
BILIRUB SERPL-MCNC: 0.7 MG/DL (ref 0–1.2)
BUN SERPL-MCNC: 24 MG/DL (ref 6–23)
CALCIUM SERPL-MCNC: 8.9 MG/DL (ref 8.6–10.3)
CHLORIDE SERPL-SCNC: 103 MMOL/L (ref 98–107)
CHOLEST SERPL-MCNC: 107 MG/DL (ref 0–199)
CHOLESTEROL/HDL RATIO: 2.6
CO2 SERPL-SCNC: 29 MMOL/L (ref 21–32)
CREAT SERPL-MCNC: 1.07 MG/DL (ref 0.5–1.3)
ERYTHROCYTE [DISTWIDTH] IN BLOOD BY AUTOMATED COUNT: 12.8 % (ref 11.5–14.5)
EST. AVERAGE GLUCOSE BLD GHB EST-MCNC: 134 MG/DL
GFR SERPL CREATININE-BSD FRML MDRD: 69 ML/MIN/1.73M*2
GLUCOSE SERPL-MCNC: 106 MG/DL (ref 74–99)
HBA1C MFR BLD: 6.3 %
HCT VFR BLD AUTO: 37.8 % (ref 41–52)
HDLC SERPL-MCNC: 40.6 MG/DL
HGB BLD-MCNC: 12 G/DL (ref 13.5–17.5)
LDLC SERPL CALC-MCNC: 50 MG/DL
MCH RBC QN AUTO: 30.9 PG (ref 26–34)
MCHC RBC AUTO-ENTMCNC: 31.7 G/DL (ref 32–36)
MCV RBC AUTO: 97 FL (ref 80–100)
NON HDL CHOLESTEROL: 66 MG/DL (ref 0–149)
NRBC BLD-RTO: 0 /100 WBCS (ref 0–0)
PLATELET # BLD AUTO: 259 X10*3/UL (ref 150–450)
POTASSIUM SERPL-SCNC: 3.8 MMOL/L (ref 3.5–5.3)
PROT SERPL-MCNC: 6.2 G/DL (ref 6.4–8.2)
RBC # BLD AUTO: 3.88 X10*6/UL (ref 4.5–5.9)
SODIUM SERPL-SCNC: 139 MMOL/L (ref 136–145)
TRIGL SERPL-MCNC: 83 MG/DL (ref 0–149)
VIT B12 SERPL-MCNC: 317 PG/ML (ref 211–911)
VLDL: 17 MG/DL (ref 0–40)
WBC # BLD AUTO: 8.8 X10*3/UL (ref 4.4–11.3)

## 2023-12-04 PROCEDURE — 36415 COLL VENOUS BLD VENIPUNCTURE: CPT

## 2023-12-04 PROCEDURE — 85027 COMPLETE CBC AUTOMATED: CPT

## 2023-12-04 PROCEDURE — 80061 LIPID PANEL: CPT

## 2023-12-04 PROCEDURE — 82607 VITAMIN B-12: CPT

## 2023-12-04 PROCEDURE — 80053 COMPREHEN METABOLIC PANEL: CPT

## 2023-12-04 PROCEDURE — 83036 HEMOGLOBIN GLYCOSYLATED A1C: CPT

## 2023-12-08 DIAGNOSIS — I10 ESSENTIAL (PRIMARY) HYPERTENSION: ICD-10-CM

## 2023-12-08 DIAGNOSIS — I48.0 PAROXYSMAL ATRIAL FIBRILLATION (MULTI): ICD-10-CM

## 2023-12-12 RX ORDER — APIXABAN 5 MG/1
5 TABLET, FILM COATED ORAL 2 TIMES DAILY
Qty: 180 TABLET | Refills: 3 | Status: SHIPPED | OUTPATIENT
Start: 2023-12-12

## 2023-12-13 ENCOUNTER — OFFICE VISIT (OUTPATIENT)
Dept: PRIMARY CARE | Facility: CLINIC | Age: 83
End: 2023-12-13
Payer: MEDICARE

## 2023-12-13 VITALS
HEIGHT: 68 IN | RESPIRATION RATE: 16 BRPM | SYSTOLIC BLOOD PRESSURE: 124 MMHG | OXYGEN SATURATION: 100 % | DIASTOLIC BLOOD PRESSURE: 70 MMHG | WEIGHT: 185 LBS | HEART RATE: 72 BPM | TEMPERATURE: 97.9 F | BODY MASS INDEX: 28.04 KG/M2

## 2023-12-13 DIAGNOSIS — I10 ESSENTIAL HYPERTENSION: ICD-10-CM

## 2023-12-13 DIAGNOSIS — D64.9 ANEMIA, UNSPECIFIED TYPE: ICD-10-CM

## 2023-12-13 DIAGNOSIS — I48.0 PAROXYSMAL ATRIAL FIBRILLATION (MULTI): ICD-10-CM

## 2023-12-13 DIAGNOSIS — E11.9 TYPE 2 DIABETES MELLITUS WITHOUT COMPLICATION, UNSPECIFIED WHETHER LONG TERM INSULIN USE (MULTI): Primary | ICD-10-CM

## 2023-12-13 DIAGNOSIS — E78.41 ELEVATED LIPOPROTEIN(A): ICD-10-CM

## 2023-12-13 PROCEDURE — 99214 OFFICE O/P EST MOD 30 MIN: CPT | Performed by: INTERNAL MEDICINE

## 2023-12-13 PROCEDURE — 1160F RVW MEDS BY RX/DR IN RCRD: CPT | Performed by: INTERNAL MEDICINE

## 2023-12-13 PROCEDURE — 1036F TOBACCO NON-USER: CPT | Performed by: INTERNAL MEDICINE

## 2023-12-13 PROCEDURE — 3078F DIAST BP <80 MM HG: CPT | Performed by: INTERNAL MEDICINE

## 2023-12-13 PROCEDURE — 1159F MED LIST DOCD IN RCRD: CPT | Performed by: INTERNAL MEDICINE

## 2023-12-13 PROCEDURE — 1126F AMNT PAIN NOTED NONE PRSNT: CPT | Performed by: INTERNAL MEDICINE

## 2023-12-13 PROCEDURE — 3074F SYST BP LT 130 MM HG: CPT | Performed by: INTERNAL MEDICINE

## 2023-12-13 ASSESSMENT — PAIN SCALES - GENERAL: PAINLEVEL: 0-NO PAIN

## 2023-12-13 ASSESSMENT — ENCOUNTER SYMPTOMS
COUGH: 0
FATIGUE: 0
FEVER: 0
SHORTNESS OF BREATH: 0

## 2023-12-13 NOTE — PROGRESS NOTES
"Subjective   Artisus LACHO Horowitz is a 83 y.o. male who presents for 4 month Diabetes follow up.    HPI   Monitoring glucose occasionally.  Ave: 120.  Denies adverse sx's r/t DM.  Taking meds as Rx'd.    Scheduled for L eye lid repair in Jan.    Has appointment w/Dr. Khan 1/29/2024    Review of Systems   Constitutional:  Negative for fatigue and fever.   Respiratory:  Negative for cough and shortness of breath.    Cardiovascular:  Negative for chest pain and leg swelling.   All other systems reviewed and are negative.      Health Maintenance Due   Topic Date Due    Diabetes: Foot Exam  Never done    DTaP/Tdap/Td Vaccines (1 - Tdap) Never done    Zoster Vaccines (2 of 3) 08/03/2011    Diabetes: Retinopathy Screening  07/12/2022    Pneumococcal Vaccine: 65+ Years (2 - PCV) 04/14/2023       Objective   /70   Pulse 72   Temp 36.6 °C (97.9 °F)   Resp 16   Ht 1.727 m (5' 8\")   Wt 83.9 kg (185 lb)   SpO2 100%   BMI 28.13 kg/m²     Physical Exam  Vitals and nursing note reviewed.   Constitutional:       Appearance: Normal appearance.   HENT:      Head: Normocephalic.   Eyes:      Conjunctiva/sclera: Conjunctivae normal.      Pupils: Pupils are equal, round, and reactive to light.   Cardiovascular:      Rate and Rhythm: Normal rate and regular rhythm.      Pulses: Normal pulses.      Heart sounds: Normal heart sounds.   Pulmonary:      Effort: Pulmonary effort is normal.      Breath sounds: Normal breath sounds.   Musculoskeletal:         General: No swelling.      Cervical back: Neck supple.   Skin:     General: Skin is warm and dry.   Neurological:      General: No focal deficit present.      Mental Status: He is oriented to person, place, and time.         Assessment/Plan   Problem List Items Addressed This Visit       Hyperlipidemia    Essential hypertension    Paroxysmal atrial fibrillation (CMS/HCC)     Other Visit Diagnoses       Type 2 diabetes mellitus without complication, unspecified whether long term " insulin use (CMS/HCC)    -  Primary    Relevant Orders    Lipid Panel    CBC    Comprehensive Metabolic Panel    Hemoglobin A1C    Anemia, unspecified type        Relevant Orders    Folate    Vitamin B12    Iron and TIBC    Ferritin            Reviewed labs  Cont meds  Stable based on symptoms and exam.  Continue established treatment plan and follow up at least yearly.

## 2023-12-27 ENCOUNTER — TELEPHONE (OUTPATIENT)
Dept: CARDIOLOGY | Facility: CLINIC | Age: 83
End: 2023-12-27
Payer: MEDICARE

## 2023-12-27 NOTE — TELEPHONE ENCOUNTER
Received clearance form for patient pending excision and repair of eyelid with tissue transfer with [facility] on 1/12/24 under [facility].   Asking for clearance and permission to hold ASA x14 days and Eliquis x2 days preop.     Last LHC with mild disease in 2020  Last Echo in 2021 printed for review.     Last seen with Dr. Nate Arnett MD F[facility] in June 2023  GFR is normal.     Printed for review with Dr. Alverto Lawson MD  in Dr. Nate Arnett MD FACC absence.

## 2023-12-29 ENCOUNTER — TELEPHONE (OUTPATIENT)
Dept: PRIMARY CARE | Facility: CLINIC | Age: 83
End: 2023-12-29
Payer: MEDICARE

## 2023-12-29 DIAGNOSIS — I10 ESSENTIAL HYPERTENSION: ICD-10-CM

## 2023-12-29 RX ORDER — METOPROLOL SUCCINATE 25 MG/1
25 TABLET, EXTENDED RELEASE ORAL DAILY
Qty: 90 TABLET | Refills: 3 | Status: SHIPPED | OUTPATIENT
Start: 2023-12-29 | End: 2024-12-28

## 2023-12-29 NOTE — TELEPHONE ENCOUNTER
Patient called [facility] refill for metoprolol 25 mg send to: [id number] - [place], [place] - [address] Phone: [phone]   Fax: [phone]

## 2024-01-02 ENCOUNTER — TELEPHONE (OUTPATIENT)
Dept: CARDIOLOGY | Facility: CLINIC | Age: 84
End: 2024-01-02
Payer: MEDICARE

## 2024-01-02 NOTE — TELEPHONE ENCOUNTER
Per Dr. Ventura MD covering for Dr. Nate Arnett MD FACC in office today, patient cleared for procedure. OK to hold ASA x14 days and Eliquis x2 days preop.   Form signed and faxed with confirmation received. Placed to scanning.     Patient spouse advised with verbal understanding. Denies questions.

## 2024-01-02 NOTE — TELEPHONE ENCOUNTER
This is a duplicate task. Patient came to EO himself in person. This nurse answered questions.   No further action necessary.

## 2024-01-24 RX ORDER — NITROFURANTOIN MACROCRYSTALS 50 MG/1
50 CAPSULE ORAL EVERY 12 HOURS
Qty: 4 CAPSULE | Refills: 0 | Status: SHIPPED | OUTPATIENT
Start: 2024-01-24 | End: 2024-01-26

## 2024-01-29 ENCOUNTER — PROCEDURE VISIT (OUTPATIENT)
Dept: UROLOGY | Facility: CLINIC | Age: 84
End: 2024-01-29
Payer: MEDICARE

## 2024-01-29 VITALS
DIASTOLIC BLOOD PRESSURE: 73 MMHG | SYSTOLIC BLOOD PRESSURE: 121 MMHG | RESPIRATION RATE: 16 BRPM | BODY MASS INDEX: 27.55 KG/M2 | HEART RATE: 73 BPM | WEIGHT: 181.22 LBS

## 2024-01-29 DIAGNOSIS — R39.15 URINARY URGENCY: Primary | ICD-10-CM

## 2024-01-29 DIAGNOSIS — N40.0 BENIGN PROSTATIC HYPERPLASIA, UNSPECIFIED WHETHER LOWER URINARY TRACT SYMPTOMS PRESENT: ICD-10-CM

## 2024-01-29 DIAGNOSIS — N32.89 BLADDER INSTABILITY: ICD-10-CM

## 2024-01-29 LAB
POC APPEARANCE, URINE: CLEAR
POC BILIRUBIN, URINE: NEGATIVE
POC BLOOD, URINE: NEGATIVE
POC COLOR, URINE: YELLOW
POC GLUCOSE, URINE: NEGATIVE MG/DL
POC KETONES, URINE: NEGATIVE MG/DL
POC LEUKOCYTES, URINE: NEGATIVE
POC NITRITE,URINE: NEGATIVE
POC PH, URINE: 6.5 PH
POC PROTEIN, URINE: NEGATIVE MG/DL
POC SPECIFIC GRAVITY, URINE: 1.01
POC UROBILINOGEN, URINE: 0.2 EU/DL

## 2024-01-29 PROCEDURE — 52000 CYSTOURETHROSCOPY: CPT | Performed by: UROLOGY

## 2024-01-29 PROCEDURE — 51741 ELECTRO-UROFLOWMETRY FIRST: CPT | Performed by: UROLOGY

## 2024-01-29 PROCEDURE — 51798 US URINE CAPACITY MEASURE: CPT | Performed by: UROLOGY

## 2024-01-29 PROCEDURE — 99214 OFFICE O/P EST MOD 30 MIN: CPT | Performed by: UROLOGY

## 2024-01-29 PROCEDURE — 81003 URINALYSIS AUTO W/O SCOPE: CPT | Performed by: UROLOGY

## 2024-01-29 RX ORDER — MIRABEGRON 25 MG/1
25 TABLET, FILM COATED, EXTENDED RELEASE ORAL DAILY
Qty: 90 TABLET | Refills: 0 | Status: SHIPPED | OUTPATIENT
Start: 2024-01-29 | End: 2024-03-14

## 2024-01-29 NOTE — PROGRESS NOTES
"  83-year-old white male referred by Dr. Nicolette Galeana for \"BPH, urinary urgency and polyuria.  Also sees Dr. Nate Arnett for cardiac disease.  History of NIDDM.  Patient was previously in management.  Now retired.  No family history of prostate or breast cancer.  Was only on Flomax and Proscar.  Has never smoked cigarettes.     October 17, 2023, patient arrives alone.  He has #1 complaint is urinary urgency.  He is on Flomax and Proscar now.  He does have diabetes.  This may either be secondary to his BPH with LUTS or possibly diabetic neuropathy.  He will proceed with a cystoscopy, flow studies and a discussion of treatment options.  We will determine whether he needs minimally invasive prostate surgery or an antimuscarinic.  He will also obtain urine studies and a PSA with renal and bladder ultrasound prior to that visit.    January 29, 2024, cystoscopy, flow studies and a discussion of treatment options.  Cystoscopy reveals a moderately obstructed prostatic urethra and mildly elevated median lobe.  Flow rate 20 cc/s, total volume 110 cc, PVR 39 cc.  He continues on daily Flomax and Proscar.  I would suggest adding Myrbetriq at the 25 mg dose and consider increasing to 50 mg if necessary.  I suspect his diabetes is playing a role in his urinary urgency.  In addition, the patient did obtain a renal ultrasound which did not identify any calculi or hydronephrosis.  Urine cytology was negative for malignant cells.  Urinalysis was negative for blood.  Urine culture no growth.  PSA 0.47.  He will return in 3 months.     PLAN:     1.  The patient will return in 3 months with flow studies      2.  Continue on Flomax 0.4 mg and Proscar 5 mg both p.o. daily and add Myrbetriq 25 mg p.o. daily     Physical Exam  Vitals and nursing note reviewed.   Constitutional:       Appearance: Normal appearance.   HENT:      Head: Normocephalic.   Pulmonary:      Effort: Pulmonary effort is normal.   Abdominal:      Palpations: Abdomen " is soft.      Tenderness: There is no abdominal tenderness.   Musculoskeletal:         General: Normal range of motion.      Cervical back: Normal range of motion and neck supple.   Neurological:      General: No focal deficit present.      Mental Status: He is alert.   Psychiatric:         Mood and Affect: Mood normal.        This note was created with voice-recognition software and was not corrected for typographical or grammatical errors.

## 2024-01-29 NOTE — PATIENT INSTRUCTIONS
It was very nice to see you and your wife Lennie once again today.  We had a full discussion regarding an enlarged prostate and the symptoms that are associated.  You are now complaining mostly of urinary urgency.  You are presently taking Flomax and Proscar and it appears to no longer have the same effectiveness.  Today you completed a cystoscopy and flow studies to further investigate.  We will add Myrbetriq to your regimen and have you return in 3 months.    This note was created with voice-recognition software and was not corrected for typographical or grammatical errors.

## 2024-01-29 NOTE — PROGRESS NOTES
"Patient ID: Artis Horowitz is a 83 y.o. male.  Pt denies any pain today. States has not had any recent hospital admits or surgeries since their last visit. Denies any concerns about falling or safety. JML      Procedures  Pt took macrodantin 50mg po as prescribed  Anesthesia: Local 2% Lidocaine  Instruments: 6F flexible disposable cystoscope  Pt brought to procedure room and placed in supine lithotomy position. Pt draped and prepped in normal sterile fashion. 10ml lidocaine instilled into urethral meatus. Pt tolerated well    I was present as chaperone for the entirety of the procedure   Maria Elena Bennett  Cystoscopy performed by Dr. Demetrio Khan  Bedside \"Time Out\" Verification   Today's Date: 1/29/2024 . I attest that this time out verification took place prior to the procedure.   Procedure: cysto   RN/LPN/MA:PARKER   Provider: [facility].   Verified By: RN/LPN/MA,   ARTIS HOROWITZ     and Provider.   Prior to the start of the procedure a time out was taken and the following were verified: the identity of the patient using two patient identifiers, the correct procedure, the correct site marked as indicated, the correct positioning for the patient and the correct equipment was obtained.   Cystoscopy - male  10:30AM      identified using two (2) forms of identification.   Procedure: diagnostic cystourethroscopy.  Procedure Note: Time Started:  Time Completed: 10:37 AM  Indications for procedure: irritable voiding symptoms.   Discussed with patient: Risks, benefits, and alternative were discussed in detail. Patient appears to understand and agrees to proceed. Patient has signed the procedure consent form.    CYSTOSCOPY:    Cystoscopy today reveals a normal distal urethra and external sphincter.  Prostatic urethra is moderately obstructed and the median lobe is mildly elevated.  Once inside the bladder, no evidence of stones, lesions or tumors.  Flow rate 20 cc/s, total volume 110 cc, PVR 39 cc.    "

## 2024-01-29 NOTE — LETTER
"January 29, 2024     Nicolette Galeana DO  [address] [facility] [address]  [place] [facility] [address]    Patient: Artis Horowitz   YOB: 1940   Date of Visit: 1/29/2024       Dear Dr. Nicolette Galeana, :    Thank you for referring Artis Horowitz to me for evaluation. Below are my notes for this consultation.  If you have questions, please do not hesitate to call me. I look forward to following your patient along with you.       Sincerely,     Demetrio Khan MD      CC: No Recipients  ______________________________________________________________________________________      83-year-old white male referred by Dr. Nicolette Galeana for \"BPH, urinary urgency and polyuria.  Also sees Dr. Nate Arnett for cardiac disease.  History of NIDDM.  Patient was previously in management.  Now retired.  No family history of prostate or breast cancer.  Was only on Flomax and Proscar.  Has never smoked cigarettes.     October 17, 2023, patient arrives alone.  He has #1 complaint is urinary urgency.  He is on Flomax and Proscar now.  He does have diabetes.  This may either be secondary to his BPH with LUTS or possibly diabetic neuropathy.  He will proceed with a cystoscopy, flow studies and a discussion of treatment options.  We will determine whether he needs minimally invasive prostate surgery or an antimuscarinic.  He will also obtain urine studies and a PSA with renal and bladder ultrasound prior to that visit.    January 29, 2024, cystoscopy, flow studies and a discussion of treatment options.  Cystoscopy reveals a moderately obstructed prostatic urethra and mildly elevated median lobe.  Flow rate 20 cc/s, total volume 110 cc, PVR 39 cc.  He continues on daily Flomax and Proscar.  I would suggest adding Myrbetriq at the 25 mg dose and consider increasing to 50 mg if necessary.  I suspect his diabetes is playing a role in his urinary urgency.  In addition, the patient did obtain a renal ultrasound which did not identify any calculi or " hydronephrosis.  Urine cytology was negative for malignant cells.  Urinalysis was negative for blood.  Urine culture no growth.  PSA 0.47.  He will return in 3 months.     PLAN:     1.  The patient will return in 3 months with flow studies      2.  Continue on Flomax 0.4 mg and Proscar 5 mg both p.o. daily and add Myrbetriq 25 mg p.o. daily     Physical Exam  Vitals and nursing note reviewed.   Constitutional:       Appearance: Normal appearance.   HENT:      Head: Normocephalic.   Pulmonary:      Effort: Pulmonary effort is normal.   Abdominal:      Palpations: Abdomen is soft.      Tenderness: There is no abdominal tenderness.   Musculoskeletal:         General: Normal range of motion.      Cervical back: Normal range of motion and neck supple.   Neurological:      General: No focal deficit present.      Mental Status: He is alert.   Psychiatric:         Mood and Affect: Mood normal.        This note was created with voice-recognition software and was not corrected for typographical or grammatical errors.

## 2024-02-09 DIAGNOSIS — N32.89 BLADDER INSTABILITY: ICD-10-CM

## 2024-02-10 RX ORDER — TROSPIUM CHLORIDE ER 60 MG/1
60 CAPSULE ORAL DAILY
Qty: 90 CAPSULE | Refills: 0 | Status: SHIPPED | OUTPATIENT
Start: 2024-02-10 | End: 2024-04-15

## 2024-02-20 DIAGNOSIS — E78.41 ELEVATED LIPOPROTEIN(A): ICD-10-CM

## 2024-02-20 RX ORDER — SIMVASTATIN 40 MG/1
40 TABLET, FILM COATED ORAL NIGHTLY
Qty: 90 TABLET | Refills: 3 | Status: SHIPPED | OUTPATIENT
Start: 2024-02-20 | End: 2025-02-19

## 2024-03-07 ENCOUNTER — LAB (OUTPATIENT)
Dept: LAB | Facility: LAB | Age: 84
End: 2024-03-07
Payer: MEDICARE

## 2024-03-07 DIAGNOSIS — D64.9 ANEMIA, UNSPECIFIED TYPE: ICD-10-CM

## 2024-03-07 DIAGNOSIS — E11.9 TYPE 2 DIABETES MELLITUS WITHOUT COMPLICATION, UNSPECIFIED WHETHER LONG TERM INSULIN USE (MULTI): ICD-10-CM

## 2024-03-07 LAB
ALBUMIN SERPL BCP-MCNC: 3.8 G/DL (ref 3.4–5)
ALP SERPL-CCNC: 90 U/L (ref 33–136)
ALT SERPL W P-5'-P-CCNC: 10 U/L (ref 10–52)
ANION GAP SERPL CALC-SCNC: 12 MMOL/L (ref 10–20)
AST SERPL W P-5'-P-CCNC: 13 U/L (ref 9–39)
BILIRUB SERPL-MCNC: 0.6 MG/DL (ref 0–1.2)
BUN SERPL-MCNC: 20 MG/DL (ref 6–23)
CALCIUM SERPL-MCNC: 9.1 MG/DL (ref 8.6–10.3)
CHLORIDE SERPL-SCNC: 103 MMOL/L (ref 98–107)
CHOLEST SERPL-MCNC: 98 MG/DL (ref 0–199)
CHOLESTEROL/HDL RATIO: 2.6
CO2 SERPL-SCNC: 28 MMOL/L (ref 21–32)
CREAT SERPL-MCNC: 1.06 MG/DL (ref 0.5–1.3)
EGFRCR SERPLBLD CKD-EPI 2021: 70 ML/MIN/1.73M*2
ERYTHROCYTE [DISTWIDTH] IN BLOOD BY AUTOMATED COUNT: 12.9 % (ref 11.5–14.5)
EST. AVERAGE GLUCOSE BLD GHB EST-MCNC: 134 MG/DL
FERRITIN SERPL-MCNC: 168 NG/ML (ref 20–300)
FOLATE SERPL-MCNC: 8.2 NG/ML
GLUCOSE SERPL-MCNC: 115 MG/DL (ref 74–99)
HBA1C MFR BLD: 6.3 %
HCT VFR BLD AUTO: 39 % (ref 41–52)
HDLC SERPL-MCNC: 38 MG/DL
HGB BLD-MCNC: 12.3 G/DL (ref 13.5–17.5)
IRON SATN MFR SERPL: 25 % (ref 25–45)
IRON SERPL-MCNC: 65 UG/DL (ref 35–150)
LDLC SERPL CALC-MCNC: 43 MG/DL
MCH RBC QN AUTO: 30.8 PG (ref 26–34)
MCHC RBC AUTO-ENTMCNC: 31.5 G/DL (ref 32–36)
MCV RBC AUTO: 98 FL (ref 80–100)
NON HDL CHOLESTEROL: 60 MG/DL (ref 0–149)
NRBC BLD-RTO: 0 /100 WBCS (ref 0–0)
PLATELET # BLD AUTO: 272 X10*3/UL (ref 150–450)
POTASSIUM SERPL-SCNC: 4.2 MMOL/L (ref 3.5–5.3)
PROT SERPL-MCNC: 6.4 G/DL (ref 6.4–8.2)
RBC # BLD AUTO: 3.99 X10*6/UL (ref 4.5–5.9)
SODIUM SERPL-SCNC: 139 MMOL/L (ref 136–145)
TIBC SERPL-MCNC: 261 UG/DL (ref 240–445)
TRIGL SERPL-MCNC: 86 MG/DL (ref 0–149)
UIBC SERPL-MCNC: 196 UG/DL (ref 110–370)
VIT B12 SERPL-MCNC: 332 PG/ML (ref 211–911)
VLDL: 17 MG/DL (ref 0–40)
WBC # BLD AUTO: 9.1 X10*3/UL (ref 4.4–11.3)

## 2024-03-07 PROCEDURE — 80053 COMPREHEN METABOLIC PANEL: CPT

## 2024-03-07 PROCEDURE — 36415 COLL VENOUS BLD VENIPUNCTURE: CPT

## 2024-03-07 PROCEDURE — 82728 ASSAY OF FERRITIN: CPT

## 2024-03-07 PROCEDURE — 80061 LIPID PANEL: CPT

## 2024-03-07 PROCEDURE — 83036 HEMOGLOBIN GLYCOSYLATED A1C: CPT

## 2024-03-07 PROCEDURE — 83540 ASSAY OF IRON: CPT

## 2024-03-07 PROCEDURE — 82746 ASSAY OF FOLIC ACID SERUM: CPT

## 2024-03-07 PROCEDURE — 83550 IRON BINDING TEST: CPT

## 2024-03-07 PROCEDURE — 85027 COMPLETE CBC AUTOMATED: CPT

## 2024-03-07 PROCEDURE — 82607 VITAMIN B-12: CPT

## 2024-03-13 ENCOUNTER — APPOINTMENT (OUTPATIENT)
Dept: PRIMARY CARE | Facility: CLINIC | Age: 84
End: 2024-03-13
Payer: MEDICARE

## 2024-03-14 ENCOUNTER — OFFICE VISIT (OUTPATIENT)
Dept: PRIMARY CARE | Facility: CLINIC | Age: 84
End: 2024-03-14
Payer: MEDICARE

## 2024-03-14 VITALS
OXYGEN SATURATION: 98 % | BODY MASS INDEX: 27.43 KG/M2 | DIASTOLIC BLOOD PRESSURE: 72 MMHG | TEMPERATURE: 97.8 F | RESPIRATION RATE: 18 BRPM | HEIGHT: 68 IN | HEART RATE: 88 BPM | SYSTOLIC BLOOD PRESSURE: 126 MMHG | WEIGHT: 181 LBS

## 2024-03-14 DIAGNOSIS — E11.9 TYPE 2 DIABETES MELLITUS WITHOUT COMPLICATION, UNSPECIFIED WHETHER LONG TERM INSULIN USE (MULTI): ICD-10-CM

## 2024-03-14 DIAGNOSIS — I10 ESSENTIAL HYPERTENSION: ICD-10-CM

## 2024-03-14 DIAGNOSIS — D64.9 ANEMIA, UNSPECIFIED TYPE: ICD-10-CM

## 2024-03-14 DIAGNOSIS — E78.41 ELEVATED LIPOPROTEIN(A): Primary | ICD-10-CM

## 2024-03-14 DIAGNOSIS — I48.0 PAROXYSMAL ATRIAL FIBRILLATION (MULTI): ICD-10-CM

## 2024-03-14 PROCEDURE — 3078F DIAST BP <80 MM HG: CPT | Performed by: INTERNAL MEDICINE

## 2024-03-14 PROCEDURE — 1160F RVW MEDS BY RX/DR IN RCRD: CPT | Performed by: INTERNAL MEDICINE

## 2024-03-14 PROCEDURE — 1123F ACP DISCUSS/DSCN MKR DOCD: CPT | Performed by: INTERNAL MEDICINE

## 2024-03-14 PROCEDURE — 1157F ADVNC CARE PLAN IN RCRD: CPT | Performed by: INTERNAL MEDICINE

## 2024-03-14 PROCEDURE — 3074F SYST BP LT 130 MM HG: CPT | Performed by: INTERNAL MEDICINE

## 2024-03-14 PROCEDURE — 99214 OFFICE O/P EST MOD 30 MIN: CPT | Performed by: INTERNAL MEDICINE

## 2024-03-14 PROCEDURE — 1159F MED LIST DOCD IN RCRD: CPT | Performed by: INTERNAL MEDICINE

## 2024-03-14 PROCEDURE — 1036F TOBACCO NON-USER: CPT | Performed by: INTERNAL MEDICINE

## 2024-03-14 ASSESSMENT — ENCOUNTER SYMPTOMS
FATIGUE: 0
SHORTNESS OF BREATH: 0
COUGH: 0
FEVER: 0

## 2024-03-14 NOTE — PROGRESS NOTES
"Subjective   Artisus LACHO Horowitz is a 83 y.o. male who presents for Diabetes and Hypertension follow up.    HPI   Monitoring glucose occasionally.  Ave: 120.  Denies adverse sx's r/t DM, HTN.  Taking meds as Rx'd.    Review of Systems   Constitutional:  Negative for fatigue and fever.   Respiratory:  Negative for cough and shortness of breath.    Cardiovascular:  Negative for chest pain and leg swelling.   All other systems reviewed and are negative.      Health Maintenance Due   Topic Date Due    Diabetes: Foot Exam  Never done    DTaP/Tdap/Td Vaccines (1 - Tdap) Never done    Zoster Vaccines (2 of 3) 08/03/2011    Diabetes: Retinopathy Screening  07/12/2022    Pneumococcal Vaccine: 65+ Years (2 - PCV) 04/14/2023    Medicare Annual Wellness Visit (AWV)  04/18/2024       Objective   /72   Pulse 88   Temp 36.6 °C (97.8 °F)   Resp 18   Ht 1.727 m (5' 8\")   Wt 82.1 kg (181 lb)   SpO2 98%   BMI 27.52 kg/m²     Physical Exam  Vitals and nursing note reviewed.   Constitutional:       Appearance: Normal appearance.   HENT:      Head: Normocephalic.   Eyes:      Conjunctiva/sclera: Conjunctivae normal.      Pupils: Pupils are equal, round, and reactive to light.   Cardiovascular:      Rate and Rhythm: Normal rate and regular rhythm.      Pulses: Normal pulses.      Heart sounds: Normal heart sounds.   Pulmonary:      Effort: Pulmonary effort is normal.      Breath sounds: Normal breath sounds.   Musculoskeletal:         General: No swelling.      Cervical back: Neck supple.   Skin:     General: Skin is warm and dry.   Neurological:      General: No focal deficit present.      Mental Status: He is oriented to person, place, and time.         Assessment/Plan   Problem List Items Addressed This Visit       Hyperlipidemia - Primary    Essential hypertension    Paroxysmal atrial fibrillation (CMS/HCC)     Other Visit Diagnoses       Type 2 diabetes mellitus without complication, unspecified whether long term insulin " use (CMS/[facility])        Relevant Orders    Lipid Panel    CBC    Comprehensive Metabolic Panel    Hemoglobin A1C    Vitamin B12    Anemia, unspecified type              Reviewed labs  Cont meds  Stable based on symptoms and exam.  Continue established treatment plan and follow up at least yearly.

## 2024-03-19 DIAGNOSIS — N32.89 BLADDER INSTABILITY: ICD-10-CM

## 2024-03-22 DIAGNOSIS — Z79.4 TYPE 2 DIABETES MELLITUS WITH OTHER SPECIFIED COMPLICATION, WITH LONG-TERM CURRENT USE OF INSULIN (MULTI): ICD-10-CM

## 2024-03-22 DIAGNOSIS — N40.1 BENIGN PROSTATIC HYPERPLASIA WITH LOWER URINARY TRACT SYMPTOMS, SYMPTOM DETAILS UNSPECIFIED: ICD-10-CM

## 2024-03-22 DIAGNOSIS — E11.69 TYPE 2 DIABETES MELLITUS WITH OTHER SPECIFIED COMPLICATION, WITH LONG-TERM CURRENT USE OF INSULIN (MULTI): ICD-10-CM

## 2024-03-22 DIAGNOSIS — I10 ESSENTIAL (PRIMARY) HYPERTENSION: ICD-10-CM

## 2024-03-22 RX ORDER — METFORMIN HYDROCHLORIDE 500 MG/1
500 TABLET, EXTENDED RELEASE ORAL DAILY
Qty: 90 TABLET | Refills: 3 | Status: SHIPPED | OUTPATIENT
Start: 2024-03-22

## 2024-03-22 RX ORDER — TAMSULOSIN HYDROCHLORIDE 0.4 MG/1
0.4 CAPSULE ORAL DAILY
Qty: 30 CAPSULE | Refills: 11 | Status: SHIPPED | OUTPATIENT
Start: 2024-03-22 | End: 2025-03-22

## 2024-03-25 RX ORDER — LOSARTAN POTASSIUM AND HYDROCHLOROTHIAZIDE 25; 100 MG/1; MG/1
1 TABLET ORAL DAILY
Qty: 90 TABLET | Refills: 3 | Status: SHIPPED | OUTPATIENT
Start: 2024-03-25

## 2024-04-15 RX ORDER — TROSPIUM CHLORIDE ER 60 MG/1
60 CAPSULE ORAL DAILY
Qty: 90 CAPSULE | Refills: 0 | Status: SHIPPED | OUTPATIENT
Start: 2024-04-15 | End: 2024-07-14

## 2024-04-29 ENCOUNTER — OFFICE VISIT (OUTPATIENT)
Dept: UROLOGY | Facility: CLINIC | Age: 84
End: 2024-04-29
Payer: MEDICARE

## 2024-04-29 VITALS
HEART RATE: 97 BPM | RESPIRATION RATE: 14 BRPM | SYSTOLIC BLOOD PRESSURE: 123 MMHG | BODY MASS INDEX: 27.13 KG/M2 | DIASTOLIC BLOOD PRESSURE: 69 MMHG | HEIGHT: 68 IN | WEIGHT: 179.01 LBS

## 2024-04-29 DIAGNOSIS — N40.1 BENIGN PROSTATIC HYPERPLASIA WITH LOWER URINARY TRACT SYMPTOMS, SYMPTOM DETAILS UNSPECIFIED: Primary | ICD-10-CM

## 2024-04-29 DIAGNOSIS — R39.15 URINARY URGENCY: ICD-10-CM

## 2024-04-29 PROCEDURE — 3078F DIAST BP <80 MM HG: CPT | Performed by: UROLOGY

## 2024-04-29 PROCEDURE — 1036F TOBACCO NON-USER: CPT | Performed by: UROLOGY

## 2024-04-29 PROCEDURE — 1159F MED LIST DOCD IN RCRD: CPT | Performed by: UROLOGY

## 2024-04-29 PROCEDURE — 1157F ADVNC CARE PLAN IN RCRD: CPT | Performed by: UROLOGY

## 2024-04-29 PROCEDURE — 99213 OFFICE O/P EST LOW 20 MIN: CPT | Performed by: UROLOGY

## 2024-04-29 PROCEDURE — 51798 US URINE CAPACITY MEASURE: CPT | Performed by: UROLOGY

## 2024-04-29 PROCEDURE — 1160F RVW MEDS BY RX/DR IN RCRD: CPT | Performed by: UROLOGY

## 2024-04-29 PROCEDURE — 1126F AMNT PAIN NOTED NONE PRSNT: CPT | Performed by: UROLOGY

## 2024-04-29 PROCEDURE — 3074F SYST BP LT 130 MM HG: CPT | Performed by: UROLOGY

## 2024-04-29 PROCEDURE — 1123F ACP DISCUSS/DSCN MKR DOCD: CPT | Performed by: UROLOGY

## 2024-04-29 ASSESSMENT — PAIN SCALES - GENERAL: PAINLEVEL: 0-NO PAIN

## 2024-04-29 ASSESSMENT — ENCOUNTER SYMPTOMS
HEMATURIA: 0
FREQUENCY: 0
DIFFICULTY URINATING: 0
DYSURIA: 0

## 2024-04-29 NOTE — PROGRESS NOTES
Patient denies any pain today. Patient has not had any recent surgeries or hospital admits. Patient denies any concerns about falling or safety. Patient has improvement with urgency. Patient taking Flomax, Proscar and Sancturia as directed. Sanctura replaced Myrbetriq due to cost. CV     Review of Systems   Genitourinary:  Negative for decreased urine volume, difficulty urinating, dysuria, enuresis, frequency, hematuria and urgency.   All other systems reviewed and are negative.

## 2024-04-29 NOTE — PATIENT INSTRUCTIONS
It was very nice to see you once again today.  We had a full discussion regarding an enlarged prostate and the symptoms that are associated.  You were complaining mostly of urinary urgency.  You are presently taking Flomax and Proscar and it appears to no longer have the same effectiveness.  We were able to add Sanctura to your regimen and you have noticed a dramatic improvement.  Very happy with your success and we will see you again in August.    This note was created with voice-recognition software and was not corrected for typographical or grammatical errors.

## 2024-04-29 NOTE — LETTER
"April 29, 2024     Nicolette Galeana DO  [address] [facility] [address]  [facility] [place] [facility] [address]    Patient: Artis Horowitz   YOB: 1940   Date of Visit: 4/29/2024       Dear Dr. Nicolette Galeana, :    Thank you for referring Artis Horowitz to me for evaluation. Below are my notes for this consultation.  If you have questions, please do not hesitate to call me. I look forward to following your patient along with you.       Sincerely,     Demetrio Khan MD      CC: No Recipients  ______________________________________________________________________________________       83-year-old white male referred by Dr. Nicolette Galeana for \"BPH, urinary urgency and polyuria.  Also sees Dr. Nate Arnett for cardiac disease.  History of NIDDM.  Patient was previously employed in management.  Now retired.  No family history of prostate or breast cancer.  Was only on Flomax and Proscar.  Has never smoked cigarettes.     October 17, 2023, patient arrives alone.  He has #1 complaint is urinary urgency.  He is on Flomax and Proscar now.  He does have diabetes.  This may either be secondary to his BPH with LUTS or possibly diabetic neuropathy.  He will proceed with a cystoscopy, flow studies and a discussion of treatment options.  We will determine whether he needs minimally invasive prostate surgery or an antimuscarinic.  He will also obtain urine studies and a PSA with renal and bladder ultrasound prior to that visit.     January 29, 2024, cystoscopy, flow studies and a discussion of treatment options.  Cystoscopy reveals a moderately obstructed prostatic urethra and mildly elevated median lobe.  Flow rate 20 cc/s, total volume 110 cc, PVR 39 cc.  He continues on daily Flomax and Proscar.  I would suggest adding Myrbetriq at the 25 mg dose and consider increasing to 50 mg if necessary.  I suspect his diabetes is playing a role in his urinary urgency.  In addition, the patient did obtain a renal ultrasound which did not identify any " calculi or hydronephrosis.  Urine cytology was negative for malignant cells.  Urinalysis was negative for blood.  Urine culture no growth.  PSA 0.47.  He will return in 3 months.    February 7, 2024, telephone call, patient states Myrbetriq is too expensive.  We substituted Sanctura XR 60 mg daily.    April 29, 2024, patient arrives alone.  He is extremely happy with the addition of Sanctura to his regimen of Flomax and Proscar.  He states he no longer has that urgency, leakage, and rushing to the bathroom especially during the nighttime hours.  He is surprised at the improvement.  PVR today 188 cc.  He will return in August.     PLAN:     1.  The patient will in August 2024 with a PVR, urinalysis, urine culture and PSA    2.  Continue on Flomax 0.4 mg and Proscar 5 mg both p.o. daily and 2 XR 60 mg p.o. daily     Physical Exam  Vitals and nursing note reviewed.   Constitutional:       Appearance: Normal appearance.   HENT:      Head: Normocephalic.   Pulmonary:      Effort: Pulmonary effort is normal.   Abdominal:      Palpations: Abdomen is soft.      Tenderness: There is no abdominal tenderness.   Musculoskeletal:         General: Normal range of motion.      Cervical back: Normal range of motion and neck supple.   Neurological:      General: No focal deficit present.      Mental Status: He is alert.   Psychiatric:         Mood and Affect: Mood normal.        This note was created with voice-recognition software and was not corrected for typographical or grammatical errors

## 2024-04-29 NOTE — PROGRESS NOTES
"   83-year-old white male referred by Dr. Nicolette Galeana for \"BPH, urinary urgency and polyuria.  Also sees Dr. Nate Arnett for cardiac disease.  History of NIDDM.  Patient was previously employed in management.  Now retired.  No family history of prostate or breast cancer.  Was only on Flomax and Proscar.  Has never smoked cigarettes.     October 17, 2023, patient arrives alone.  He has #1 complaint is urinary urgency.  He is on Flomax and Proscar now.  He does have diabetes.  This may either be secondary to his BPH with LUTS or possibly diabetic neuropathy.  He will proceed with a cystoscopy, flow studies and a discussion of treatment options.  We will determine whether he needs minimally invasive prostate surgery or an antimuscarinic.  He will also obtain urine studies and a PSA with renal and bladder ultrasound prior to that visit.     January 29, 2024, cystoscopy, flow studies and a discussion of treatment options.  Cystoscopy reveals a moderately obstructed prostatic urethra and mildly elevated median lobe.  Flow rate 20 cc/s, total volume 110 cc, PVR 39 cc.  He continues on daily Flomax and Proscar.  I would suggest adding Myrbetriq at the 25 mg dose and consider increasing to 50 mg if necessary.  I suspect his diabetes is playing a role in his urinary urgency.  In addition, the patient did obtain a renal ultrasound which did not identify any calculi or hydronephrosis.  Urine cytology was negative for malignant cells.  Urinalysis was negative for blood.  Urine culture no growth.  PSA 0.47.  He will return in 3 months.    February 7, 2024, telephone call, patient states Myrbetriq is too expensive.  We substituted Sanctura XR 60 mg daily.    April 29, 2024, patient arrives alone.  He is extremely happy with the addition of Sanctura to his regimen of Flomax and Proscar.  He states he no longer has that urgency, leakage, and rushing to the bathroom especially during the nighttime hours.  He is surprised at the " improvement.  PVR today 188 cc.  He will return in August.     PLAN:     1.  The patient will in August 2024 with a PVR, urinalysis, urine culture and PSA    2.  Continue on Flomax 0.4 mg and Proscar 5 mg both p.o. daily and 2 XR 60 mg p.o. daily     Physical Exam  Vitals and nursing note reviewed.   Constitutional:       Appearance: Normal appearance.   HENT:      Head: Normocephalic.   Pulmonary:      Effort: Pulmonary effort is normal.   Abdominal:      Palpations: Abdomen is soft.      Tenderness: There is no abdominal tenderness.   Musculoskeletal:         General: Normal range of motion.      Cervical back: Normal range of motion and neck supple.   Neurological:      General: No focal deficit present.      Mental Status: He is alert.   Psychiatric:         Mood and Affect: Mood normal.        This note was created with voice-recognition software and was not corrected for typographical or grammatical errors

## 2024-05-21 DIAGNOSIS — N40.0 BENIGN PROSTATIC HYPERPLASIA, UNSPECIFIED WHETHER LOWER URINARY TRACT SYMPTOMS PRESENT: ICD-10-CM

## 2024-05-21 RX ORDER — FINASTERIDE 5 MG/1
5 TABLET, FILM COATED ORAL DAILY
Qty: 90 TABLET | Refills: 3 | Status: SHIPPED | OUTPATIENT
Start: 2024-05-21

## 2024-06-12 ENCOUNTER — LAB (OUTPATIENT)
Dept: LAB | Facility: LAB | Age: 84
End: 2024-06-12
Payer: MEDICARE

## 2024-06-12 DIAGNOSIS — E11.9 TYPE 2 DIABETES MELLITUS WITHOUT COMPLICATION, UNSPECIFIED WHETHER LONG TERM INSULIN USE (MULTI): ICD-10-CM

## 2024-06-12 LAB
ALBUMIN SERPL BCP-MCNC: 4 G/DL (ref 3.4–5)
ALP SERPL-CCNC: 85 U/L (ref 33–136)
ALT SERPL W P-5'-P-CCNC: 16 U/L (ref 10–52)
ANION GAP SERPL CALC-SCNC: 12 MMOL/L (ref 10–20)
AST SERPL W P-5'-P-CCNC: 13 U/L (ref 9–39)
BILIRUB SERPL-MCNC: 0.5 MG/DL (ref 0–1.2)
BUN SERPL-MCNC: 27 MG/DL (ref 6–23)
CALCIUM SERPL-MCNC: 9 MG/DL (ref 8.6–10.3)
CHLORIDE SERPL-SCNC: 103 MMOL/L (ref 98–107)
CHOLEST SERPL-MCNC: 102 MG/DL (ref 0–199)
CHOLESTEROL/HDL RATIO: 2.3
CO2 SERPL-SCNC: 29 MMOL/L (ref 21–32)
CREAT SERPL-MCNC: 1.16 MG/DL (ref 0.5–1.3)
EGFRCR SERPLBLD CKD-EPI 2021: 63 ML/MIN/1.73M*2
ERYTHROCYTE [DISTWIDTH] IN BLOOD BY AUTOMATED COUNT: 13 % (ref 11.5–14.5)
EST. AVERAGE GLUCOSE BLD GHB EST-MCNC: 128 MG/DL
GLUCOSE SERPL-MCNC: 101 MG/DL (ref 74–99)
HBA1C MFR BLD: 6.1 %
HCT VFR BLD AUTO: 40 % (ref 41–52)
HDLC SERPL-MCNC: 44.6 MG/DL
HGB BLD-MCNC: 12.8 G/DL (ref 13.5–17.5)
LDLC SERPL CALC-MCNC: 43 MG/DL
MCH RBC QN AUTO: 31.4 PG (ref 26–34)
MCHC RBC AUTO-ENTMCNC: 32 G/DL (ref 32–36)
MCV RBC AUTO: 98 FL (ref 80–100)
NON HDL CHOLESTEROL: 57 MG/DL (ref 0–149)
NRBC BLD-RTO: 0 /100 WBCS (ref 0–0)
PLATELET # BLD AUTO: 291 X10*3/UL (ref 150–450)
POTASSIUM SERPL-SCNC: 4.5 MMOL/L (ref 3.5–5.3)
PROT SERPL-MCNC: 6.4 G/DL (ref 6.4–8.2)
RBC # BLD AUTO: 4.07 X10*6/UL (ref 4.5–5.9)
SODIUM SERPL-SCNC: 139 MMOL/L (ref 136–145)
TRIGL SERPL-MCNC: 72 MG/DL (ref 0–149)
VIT B12 SERPL-MCNC: 406 PG/ML (ref 211–911)
VLDL: 14 MG/DL (ref 0–40)
WBC # BLD AUTO: 11.9 X10*3/UL (ref 4.4–11.3)

## 2024-06-12 PROCEDURE — 83036 HEMOGLOBIN GLYCOSYLATED A1C: CPT

## 2024-06-12 PROCEDURE — 82607 VITAMIN B-12: CPT

## 2024-06-12 PROCEDURE — 36415 COLL VENOUS BLD VENIPUNCTURE: CPT

## 2024-06-12 PROCEDURE — 85027 COMPLETE CBC AUTOMATED: CPT

## 2024-06-12 PROCEDURE — 80053 COMPREHEN METABOLIC PANEL: CPT

## 2024-06-12 PROCEDURE — 80061 LIPID PANEL: CPT

## 2024-06-19 ENCOUNTER — APPOINTMENT (OUTPATIENT)
Dept: PRIMARY CARE | Facility: CLINIC | Age: 84
End: 2024-06-19
Payer: MEDICARE

## 2024-06-19 VITALS
OXYGEN SATURATION: 100 % | HEART RATE: 72 BPM | TEMPERATURE: 98 F | DIASTOLIC BLOOD PRESSURE: 68 MMHG | WEIGHT: 176 LBS | SYSTOLIC BLOOD PRESSURE: 108 MMHG | BODY MASS INDEX: 27.62 KG/M2 | RESPIRATION RATE: 16 BRPM | HEIGHT: 67 IN

## 2024-06-19 DIAGNOSIS — D63.8 ANEMIA IN OTHER CHRONIC DISEASES CLASSIFIED ELSEWHERE: ICD-10-CM

## 2024-06-19 DIAGNOSIS — I10 ESSENTIAL HYPERTENSION: ICD-10-CM

## 2024-06-19 DIAGNOSIS — I73.9 PVD (PERIPHERAL VASCULAR DISEASE) (CMS-HCC): ICD-10-CM

## 2024-06-19 DIAGNOSIS — I48.0 PAROXYSMAL ATRIAL FIBRILLATION (MULTI): ICD-10-CM

## 2024-06-19 DIAGNOSIS — I25.10 CORONARY ARTERY DISEASE INVOLVING NATIVE CORONARY ARTERY OF NATIVE HEART WITHOUT ANGINA PECTORIS: ICD-10-CM

## 2024-06-19 DIAGNOSIS — N32.89 BLADDER INSTABILITY: ICD-10-CM

## 2024-06-19 DIAGNOSIS — E78.41 ELEVATED LIPOPROTEIN(A): ICD-10-CM

## 2024-06-19 DIAGNOSIS — M05.79 RHEUMATOID ARTHRITIS INVOLVING MULTIPLE SITES WITH POSITIVE RHEUMATOID FACTOR (MULTI): ICD-10-CM

## 2024-06-19 DIAGNOSIS — Z00.00 HEALTHCARE MAINTENANCE: Primary | ICD-10-CM

## 2024-06-19 DIAGNOSIS — E11.9 TYPE 2 DIABETES MELLITUS WITHOUT COMPLICATION, UNSPECIFIED WHETHER LONG TERM INSULIN USE (MULTI): ICD-10-CM

## 2024-06-19 PROCEDURE — 3078F DIAST BP <80 MM HG: CPT | Performed by: INTERNAL MEDICINE

## 2024-06-19 PROCEDURE — 1123F ACP DISCUSS/DSCN MKR DOCD: CPT | Performed by: INTERNAL MEDICINE

## 2024-06-19 PROCEDURE — 99214 OFFICE O/P EST MOD 30 MIN: CPT | Performed by: INTERNAL MEDICINE

## 2024-06-19 PROCEDURE — 3074F SYST BP LT 130 MM HG: CPT | Performed by: INTERNAL MEDICINE

## 2024-06-19 PROCEDURE — 1036F TOBACCO NON-USER: CPT | Performed by: INTERNAL MEDICINE

## 2024-06-19 PROCEDURE — G0439 PPPS, SUBSEQ VISIT: HCPCS | Performed by: INTERNAL MEDICINE

## 2024-06-19 PROCEDURE — 1159F MED LIST DOCD IN RCRD: CPT | Performed by: INTERNAL MEDICINE

## 2024-06-19 PROCEDURE — 1157F ADVNC CARE PLAN IN RCRD: CPT | Performed by: INTERNAL MEDICINE

## 2024-06-19 PROCEDURE — 1170F FXNL STATUS ASSESSED: CPT | Performed by: INTERNAL MEDICINE

## 2024-06-19 PROCEDURE — 1160F RVW MEDS BY RX/DR IN RCRD: CPT | Performed by: INTERNAL MEDICINE

## 2024-06-19 PROCEDURE — 1158F ADVNC CARE PLAN TLK DOCD: CPT | Performed by: INTERNAL MEDICINE

## 2024-06-19 ASSESSMENT — ENCOUNTER SYMPTOMS
SHORTNESS OF BREATH: 0
OCCASIONAL FEELINGS OF UNSTEADINESS: 0
LOSS OF SENSATION IN FEET: 0
COUGH: 0
FEVER: 0
DEPRESSION: 0
FATIGUE: 0

## 2024-06-19 ASSESSMENT — ACTIVITIES OF DAILY LIVING (ADL)
TAKING_MEDICATION: INDEPENDENT
DOING_HOUSEWORK: INDEPENDENT
BATHING: INDEPENDENT
GROCERY_SHOPPING: INDEPENDENT
DRESSING: INDEPENDENT
MANAGING_FINANCES: INDEPENDENT

## 2024-06-19 ASSESSMENT — PATIENT HEALTH QUESTIONNAIRE - PHQ9
2. FEELING DOWN, DEPRESSED OR HOPELESS: NOT AT ALL
1. LITTLE INTEREST OR PLEASURE IN DOING THINGS: NOT AT ALL
SUM OF ALL RESPONSES TO PHQ9 QUESTIONS 1 AND 2: 0

## 2024-06-19 NOTE — PROGRESS NOTES
"Subjective   Reason for Visit: Artis oHrowitz is an 83 y.o. male here for a Medicare Wellness visit.     Past Medical, Surgical, and Family History reviewed and updated in chart.    Reviewed all medications by prescribing practitioner or clinical pharmacist (such as prescriptions, OTCs, herbal therapies and supplements) and documented in the medical record.    HPI  Influenza 2023  Covid 2021, 2022, 2023  PCV13 2016  PPSV23 2022  Shingrix   Tdap  Colonoscopy 2021 brown 3-5  Psa 10/23 larchian  Adv Dir Yes  Bmi 27  Eye exam 24  Fall risk 24  Depression screen 24    Melquiades in july    Patient Care Team:  Nicolette Galeana DO as PCP - General  Nicolette Galeana DO as PCP - [facility] Attributed Provider  Demetrio Khan MD as Surgeon (Urology)     Review of Systems   Constitutional:  Negative for fatigue and fever.   Respiratory:  Negative for cough and shortness of breath.    Cardiovascular:  Negative for chest pain and leg swelling.   All other systems reviewed and are negative.      Objective   Vitals:  /68   Pulse 72   Temp 36.7 °C (98 °F)   Resp 16   Ht 1.702 m (5' 7\")   Wt 79.8 kg (176 lb)   SpO2 100%   BMI 27.57 kg/m²       Physical Exam  Vitals and nursing note reviewed.   Constitutional:       Appearance: Normal appearance.   HENT:      Head: Normocephalic.   Eyes:      Conjunctiva/sclera: Conjunctivae normal.      Pupils: Pupils are equal, round, and reactive to light.   Cardiovascular:      Rate and Rhythm: Normal rate and regular rhythm.      Pulses: Normal pulses.      Heart sounds: Normal heart sounds.   Pulmonary:      Effort: Pulmonary effort is normal.      Breath sounds: Normal breath sounds.   Musculoskeletal:         General: No swelling.      Cervical back: Neck supple.   Skin:     General: Skin is warm and dry.   Neurological:      General: No focal deficit present.      Mental Status: He is oriented to person, place, and time.         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  Continue established treatment plan and follow up at least yearly.

## 2024-06-20 RX ORDER — TROSPIUM CHLORIDE ER 60 MG/1
60 CAPSULE ORAL DAILY
Qty: 90 CAPSULE | Refills: 3 | Status: SHIPPED | OUTPATIENT
Start: 2024-06-20

## 2024-06-27 ENCOUNTER — APPOINTMENT (OUTPATIENT)
Dept: CARDIOLOGY | Facility: CLINIC | Age: 84
End: 2024-06-27
Payer: MEDICARE

## 2024-06-27 VITALS
BODY MASS INDEX: 27.11 KG/M2 | HEART RATE: 80 BPM | HEIGHT: 68 IN | WEIGHT: 178.9 LBS | DIASTOLIC BLOOD PRESSURE: 64 MMHG | SYSTOLIC BLOOD PRESSURE: 110 MMHG

## 2024-06-27 DIAGNOSIS — I10 ESSENTIAL HYPERTENSION: ICD-10-CM

## 2024-06-27 DIAGNOSIS — I25.10 CORONARY ARTERY DISEASE INVOLVING NATIVE CORONARY ARTERY OF NATIVE HEART WITHOUT ANGINA PECTORIS: ICD-10-CM

## 2024-06-27 DIAGNOSIS — I48.0 PAROXYSMAL ATRIAL FIBRILLATION (MULTI): ICD-10-CM

## 2024-06-27 DIAGNOSIS — E08.29: ICD-10-CM

## 2024-06-27 DIAGNOSIS — Z78.9 NEVER SMOKED TOBACCO: ICD-10-CM

## 2024-06-27 DIAGNOSIS — R80.9: ICD-10-CM

## 2024-06-27 DIAGNOSIS — E78.41 ELEVATED LIPOPROTEIN(A): ICD-10-CM

## 2024-06-27 DIAGNOSIS — I73.9 PVD (PERIPHERAL VASCULAR DISEASE) (CMS-HCC): ICD-10-CM

## 2024-06-27 PROCEDURE — 99213 OFFICE O/P EST LOW 20 MIN: CPT | Performed by: INTERNAL MEDICINE

## 2024-06-27 PROCEDURE — 1159F MED LIST DOCD IN RCRD: CPT | Performed by: INTERNAL MEDICINE

## 2024-06-27 PROCEDURE — 1157F ADVNC CARE PLAN IN RCRD: CPT | Performed by: INTERNAL MEDICINE

## 2024-06-27 PROCEDURE — 3078F DIAST BP <80 MM HG: CPT | Performed by: INTERNAL MEDICINE

## 2024-06-27 PROCEDURE — 3074F SYST BP LT 130 MM HG: CPT | Performed by: INTERNAL MEDICINE

## 2024-06-27 PROCEDURE — 1123F ACP DISCUSS/DSCN MKR DOCD: CPT | Performed by: INTERNAL MEDICINE

## 2024-06-27 PROCEDURE — 1036F TOBACCO NON-USER: CPT | Performed by: INTERNAL MEDICINE

## 2024-06-27 NOTE — PROGRESS NOTES
Referred by Dr. Oneal ref. provider found provider found for   Chief Complaint   Patient presents with    Follow-up     1 year follow up        History of Present Illness  Artis Horowitz is a 83 y.o. year old male patient doing well from a cardiac standpoint no complaint no symptoms of chest pain or shortness of breath.  Denies any symptoms of palpitations syncope or presyncope.  I discussed with the patient in length we will continue medication will call for any problem and follow-up as scheduled    Past Medical History  Past Medical History:   Diagnosis Date    Benign prostatic hyperplasia with lower urinary tract symptoms     Enlarged prostate with lower urinary tract symptoms (LUTS)    Encounter for general adult medical examination without abnormal findings     Health care maintenance    Other bursitis of hip, unspecified hip     Bursitis of hip    Other conditions influencing health status     History of cough    Other fecal abnormalities 05/14/2021    Occult blood in stools    Other specified abnormal findings of blood chemistry     Creatinine elevation    Pain in left leg     Pain of left lower extremity    Personal history of other diseases of the circulatory system     History of coronary artery disease    Personal history of other diseases of the circulatory system     History of abnormal electrocardiography    Personal history of other diseases of the circulatory system 07/29/2019    History of hypertension    Personal history of other diseases of the digestive system     History of constipation    Personal history of other diseases of the musculoskeletal system and connective tissue     History of spinal stenosis    Personal history of other diseases of the musculoskeletal system and connective tissue     History of rheumatoid arthritis    Personal history of other diseases of the musculoskeletal system and connective tissue     History of backache    Personal history of other diseases of the  musculoskeletal system and connective tissue     History of muscle spasm    Personal history of other diseases of the nervous system and sense organs     History of tinnitus    Personal history of other diseases of the nervous system and sense organs     History of hearing loss    Personal history of other diseases of the respiratory system     History of acute bronchitis    Personal history of other endocrine, nutritional and metabolic disease     History of hyperlipidemia    Personal history of other endocrine, nutritional and metabolic disease 09/01/2020    History of obesity    Personal history of other endocrine, nutritional and metabolic disease 03/27/2020    History of diabetes mellitus    Personal history of other infectious and parasitic diseases     History of candidiasis    Personal history of other specified conditions     History of nasal congestion    Stress incontinence (female) (male)     Male stress incontinence       Social History  Social History     Tobacco Use    Smoking status: Never    Smokeless tobacco: Never   Vaping Use    Vaping status: Never Used   Substance Use Topics    Alcohol use: Yes     Comment: 2x a week    Drug use: Not Currently       Family History     Family History   Problem Relation Name Age of Onset    Heart disease Mother      Arthritis Mother         Review of Systems  As per HPI, all other systems reviewed and negative.    Allergies:  No Known Allergies     Outpatient Medications:  Current Outpatient Medications   Medication Instructions    Accu-Chek Beulah Plus test strp strip USE ONE STRIP TO CHECK GLUCOSE ONCE DAILY    acetaminophen (Tylenol) 325 mg tablet oral, Every 6 hours PRN    aspirin 81 mg chewable tablet oral, Daily RT    clobetasol (Temovate) 0.05 % cream APPLY TO AFFECTED AREAS TWICE DAILY AS NEEDED FOR FLARES    docusate sodium (Colace) 100 mg capsule 1 capsule, oral, 2 times daily PRN    Eliquis 5 mg, oral, 2 times daily    finasteride (PROSCAR) 5 mg, oral,  Daily    fluocinolone (Derma-Smoothe) 0.01 % external oil APPLY TO THE AFFECTED AREA(S) DAILY    halobetasol (UltraVATE) 0.05 % cream to affected area    hydroxychloroquine (Plaquenil) 200 mg tablet oral, 2 times daily    losartan-hydrochlorothiazide (Hyzaar) 100-25 mg tablet 1 tablet, oral, Daily    metFORMIN XR (GLUCOPHAGE-XR) 500 mg, oral, Daily    metoprolol succinate XL (TOPROL-XL) 25 mg, oral, Daily    simvastatin (ZOCOR) 40 mg, oral, Nightly    tamsulosin (FLOMAX) 0.4 mg, oral, Daily    trospium (SANCTURA XR) 60 mg, oral, Daily    Unilet Lancet 33 gauge misc USE AS DIRECTED to check blood sugar ONCE DAILY         Vitals:  Vitals:    06/27/24 0802   BP: 110/64   Pulse: 80       Physical Exam:  Physical Exam  Constitutional:       Appearance: Normal appearance.   HENT:      Head: Normocephalic and atraumatic.   Eyes:      Extraocular Movements: Extraocular movements intact.      Pupils: Pupils are equal, round, and reactive to light.   Cardiovascular:      Rate and Rhythm: Normal rate and regular rhythm.      Pulses: Normal pulses.      Heart sounds: Normal heart sounds.   Pulmonary:      Effort: Pulmonary effort is normal.      Breath sounds: Normal breath sounds.   Abdominal:      General: Abdomen is flat.      Palpations: Abdomen is soft.   Musculoskeletal:      Right lower leg: No edema.      Left lower leg: No edema.   Skin:     General: Skin is warm and dry.   Neurological:      General: No focal deficit present.      Mental Status: He is alert and oriented to person, place, and time.             Assessment/Plan   Diagnoses and all orders for this visit:  Coronary artery disease involving native coronary artery of native heart without angina pectoris  Essential hypertension  Elevated lipoprotein(a)  Paroxysmal atrial fibrillation (Multi)  PVD (peripheral vascular disease) (CMS-formerly [facility])  Diabetes mellitus due to underlying condition, controlled, with microalbuminuria, without long-term current use of insulin  (Multi)  BMI 27.0-27.9,adult  Never smoked tobacco          Nate Arnett MD [facility]  Interventional Cardiology   [facility]     Thank you for allowing me to participate in the care of this patient. Please do not hesitate to contact me with any further questions or concerns.

## 2024-06-27 NOTE — PATIENT INSTRUCTIONS
Patient to follow up in 1 year with Dr. Nate Arnett MD [facility]     No changes today.   Continue same medications and treatments.   Patient educated on proper medication use.   Patient educated on risk factor modification.   Please bring any lab results from other providers / physicians to your next appointment.     Please bring all medicines, vitamins, and herbal supplements with you when you come to the office.     Prescriptions will not be filled unless you are compliant with your follow up appointments or have a follow up appointment scheduled as per instruction of your physician. Refills should be requested at the time of your visit.    IAudi RN am scribing for and in the presence of Dr. Nate Arnett MD [facility]

## 2024-08-19 ENCOUNTER — LAB (OUTPATIENT)
Dept: LAB | Facility: LAB | Age: 84
End: 2024-08-19
Payer: MEDICARE

## 2024-08-19 DIAGNOSIS — N40.0 BENIGN PROSTATIC HYPERPLASIA, UNSPECIFIED WHETHER LOWER URINARY TRACT SYMPTOMS PRESENT: ICD-10-CM

## 2024-08-19 DIAGNOSIS — Z12.5 SPECIAL SCREENING, PROSTATE CANCER: ICD-10-CM

## 2024-08-19 LAB — PSA SERPL-MCNC: 0.69 NG/ML

## 2024-08-19 PROCEDURE — G0103 PSA SCREENING: HCPCS

## 2024-08-26 ENCOUNTER — APPOINTMENT (OUTPATIENT)
Dept: UROLOGY | Facility: CLINIC | Age: 84
End: 2024-08-26
Payer: MEDICARE

## 2024-08-26 VITALS
HEIGHT: 68 IN | DIASTOLIC BLOOD PRESSURE: 67 MMHG | HEART RATE: 77 BPM | SYSTOLIC BLOOD PRESSURE: 112 MMHG | BODY MASS INDEX: 26.7 KG/M2 | RESPIRATION RATE: 18 BRPM | WEIGHT: 176.15 LBS

## 2024-08-26 DIAGNOSIS — N40.0 BENIGN PROSTATIC HYPERPLASIA, UNSPECIFIED WHETHER LOWER URINARY TRACT SYMPTOMS PRESENT: ICD-10-CM

## 2024-08-26 DIAGNOSIS — N40.1 BENIGN PROSTATIC HYPERPLASIA WITH LOWER URINARY TRACT SYMPTOMS, SYMPTOM DETAILS UNSPECIFIED: ICD-10-CM

## 2024-08-26 DIAGNOSIS — R39.15 URINARY URGENCY: ICD-10-CM

## 2024-08-26 DIAGNOSIS — N40.1 BENIGN PROSTATIC HYPERPLASIA WITH LOWER URINARY TRACT SYMPTOMS, SYMPTOM DETAILS UNSPECIFIED: Primary | ICD-10-CM

## 2024-08-26 DIAGNOSIS — N32.89 BLADDER INSTABILITY: ICD-10-CM

## 2024-08-26 PROCEDURE — 1160F RVW MEDS BY RX/DR IN RCRD: CPT | Performed by: UROLOGY

## 2024-08-26 PROCEDURE — 51798 US URINE CAPACITY MEASURE: CPT | Performed by: UROLOGY

## 2024-08-26 PROCEDURE — 1123F ACP DISCUSS/DSCN MKR DOCD: CPT | Performed by: UROLOGY

## 2024-08-26 PROCEDURE — 3078F DIAST BP <80 MM HG: CPT | Performed by: UROLOGY

## 2024-08-26 PROCEDURE — 3074F SYST BP LT 130 MM HG: CPT | Performed by: UROLOGY

## 2024-08-26 PROCEDURE — 1036F TOBACCO NON-USER: CPT | Performed by: UROLOGY

## 2024-08-26 PROCEDURE — 1157F ADVNC CARE PLAN IN RCRD: CPT | Performed by: UROLOGY

## 2024-08-26 PROCEDURE — 1126F AMNT PAIN NOTED NONE PRSNT: CPT | Performed by: UROLOGY

## 2024-08-26 PROCEDURE — 99213 OFFICE O/P EST LOW 20 MIN: CPT | Performed by: UROLOGY

## 2024-08-26 PROCEDURE — 1159F MED LIST DOCD IN RCRD: CPT | Performed by: UROLOGY

## 2024-08-26 RX ORDER — TAMSULOSIN HYDROCHLORIDE 0.4 MG/1
0.4 CAPSULE ORAL DAILY
Qty: 90 CAPSULE | Refills: 3 | Status: SHIPPED | OUTPATIENT
Start: 2024-08-26 | End: 2025-08-26

## 2024-08-26 RX ORDER — FINASTERIDE 5 MG/1
5 TABLET, FILM COATED ORAL DAILY
Qty: 90 TABLET | Refills: 3 | Status: SHIPPED | OUTPATIENT
Start: 2024-08-26

## 2024-08-26 RX ORDER — TROSPIUM CHLORIDE ER 60 MG/1
60 CAPSULE ORAL DAILY
Qty: 90 CAPSULE | Refills: 3 | Status: SHIPPED | OUTPATIENT
Start: 2024-08-26

## 2024-08-26 ASSESSMENT — PAIN SCALES - GENERAL: PAINLEVEL: 0-NO PAIN

## 2024-08-26 NOTE — PROGRESS NOTES
Patient denies any pain today. Patient has not had any recent surgeries or hospital admits. Patient denies any concerns about falling or safety. Patient has concerns for occasional urgency.     Review of Systems   Genitourinary:  Positive for urgency.   All other systems reviewed and are negative.

## 2024-08-26 NOTE — PROGRESS NOTES
"  84 year-old white male referred by Dr. Nicolette Galeana for \"BPH, urinary urgency and polyuria.  Also sees Dr. Nate Arnett for cardiac disease.  History of NIDDM.  Patient was previously employed in management.  Now retired.  No family history of prostate or breast cancer.  Was only on Flomax and Proscar.  Has never smoked cigarettes.     October 17, 2023, patient arrives alone.  He has #1 complaint is urinary urgency.  He is on Flomax and Proscar now.  He does have diabetes.  This may either be secondary to his BPH with LUTS or possibly diabetic neuropathy.  He will proceed with a cystoscopy, flow studies and a discussion of treatment options.  We will determine whether he needs minimally invasive prostate surgery or an antimuscarinic.  He will also obtain urine studies and a PSA with renal and bladder ultrasound prior to that visit.     January 29, 2024, cystoscopy, flow studies and a discussion of treatment options.  Cystoscopy reveals a moderately obstructed prostatic urethra and mildly elevated median lobe.  Flow rate 20 cc/s, total volume 110 cc, PVR 39 cc.  He continues on daily Flomax and Proscar.  I would suggest adding Myrbetriq at the 25 mg dose and consider increasing to 50 mg if necessary.  I suspect his diabetes is playing a role in his urinary urgency.  In addition, the patient did obtain a renal ultrasound which did not identify any calculi or hydronephrosis.  Urine cytology was negative for malignant cells.  Urinalysis was negative for blood.  Urine culture no growth.  PSA 0.47.  He will return in 3 months.     February 7, 2024, telephone call, patient states Myrbetriq is too expensive.  We substituted Sanctura XR 60 mg daily.     April 29, 2024, patient arrives alone.  He is extremely happy with the addition of Sanctura to his regimen of Flomax and Proscar.  He states he no longer has that urgency, leakage, and rushing to the bathroom especially during the nighttime hours.  He is surprised at the " improvement.  PVR today 188 cc.  He will return in August.    August 26, 2024, patient arrives alone.  He is very pleased with his regimen of Flomax, Proscar and Sanctura XR.  PVR dramatically decreased and now 82 cc.  No more incontinence or urinary urgency.  PSA, corrected is 1.38.  We will see him again in 1 year.     PLAN:     1.  The patient will in August 2025 with a PVR, urinalysis, urine culture and PSA     2.  Continue on Flomax 0.4 mg and Proscar 5 mg both p.o. daily and Sanctura XR 60 mg p.o. daily     Physical Exam  Vitals and nursing note reviewed.   Constitutional:       Appearance: Normal appearance.   HENT:      Head: Normocephalic.   Pulmonary:      Effort: Pulmonary effort is normal.   Abdominal:      Palpations: Abdomen is soft.      Tenderness: There is no abdominal tenderness.   Musculoskeletal:         General: Normal range of motion.      Cervical back: Normal range of motion and neck supple.   Neurological:      General: No focal deficit present.      Mental Status: He is alert.   Psychiatric:         Mood and Affect: Mood normal.        This note was created with voice-recognition software and was not corrected for typographical or grammatical errors

## 2024-08-26 NOTE — LETTER
"August 26, 2024     Nicolette Galeana DO  [address]  [place] [address]    Patient: Artis Horowitz   YOB: 1940   Date of Visit: 8/26/2024       Dear Dr. Nicolette Galeana, :    Thank you for referring Artis Horowitz to me for evaluation. Below are my notes for this consultation.  If you have questions, please do not hesitate to call me. I look forward to following your patient along with you.       Sincerely,     Demetrio Khan MD      CC: No Recipients  ______________________________________________________________________________________      84 year-old white male referred by Dr. Nicolette Galeana for \"BPH, urinary urgency and polyuria.  Also sees Dr. Nate Arnett for cardiac disease.  History of NIDDM.  Patient was previously employed in management.  Now retired.  No family history of prostate or breast cancer.  Was only on Flomax and Proscar.  Has never smoked cigarettes.     October 17, 2023, patient arrives alone.  He has #1 complaint is urinary urgency.  He is on Flomax and Proscar now.  He does have diabetes.  This may either be secondary to his BPH with LUTS or possibly diabetic neuropathy.  He will proceed with a cystoscopy, flow studies and a discussion of treatment options.  We will determine whether he needs minimally invasive prostate surgery or an antimuscarinic.  He will also obtain urine studies and a PSA with renal and bladder ultrasound prior to that visit.     January 29, 2024, cystoscopy, flow studies and a discussion of treatment options.  Cystoscopy reveals a moderately obstructed prostatic urethra and mildly elevated median lobe.  Flow rate 20 cc/s, total volume 110 cc, PVR 39 cc.  He continues on daily Flomax and Proscar.  I would suggest adding Myrbetriq at the 25 mg dose and consider increasing to 50 mg if necessary.  I suspect his diabetes is playing a role in his urinary urgency.  In addition, the patient did obtain a renal ultrasound which did not identify any " calculi or hydronephrosis.  Urine cytology was negative for malignant cells.  Urinalysis was negative for blood.  Urine culture no growth.  PSA 0.47.  He will return in 3 months.     February 7, 2024, telephone call, patient states Myrbetriq is too expensive.  We substituted Sanctura XR 60 mg daily.     April 29, 2024, patient arrives alone.  He is extremely happy with the addition of Sanctura to his regimen of Flomax and Proscar.  He states he no longer has that urgency, leakage, and rushing to the bathroom especially during the nighttime hours.  He is surprised at the improvement.  PVR today 188 cc.  He will return in August.    August 26, 2024, patient arrives alone.  He is very pleased with his regimen of Flomax, Proscar and Sanctura XR.  PVR dramatically decreased and now 82 cc.  No more incontinence or urinary urgency.  PSA, corrected is 1.38.  We will see him again in 1 year.     PLAN:     1.  The patient will in August 2025 with a PVR, urinalysis, urine culture and PSA     2.  Continue on Flomax 0.4 mg and Proscar 5 mg both p.o. daily and Sanctura XR 60 mg p.o. daily     Physical Exam  Vitals and nursing note reviewed.   Constitutional:       Appearance: Normal appearance.   HENT:      Head: Normocephalic.   Pulmonary:      Effort: Pulmonary effort is normal.   Abdominal:      Palpations: Abdomen is soft.      Tenderness: There is no abdominal tenderness.   Musculoskeletal:         General: Normal range of motion.      Cervical back: Normal range of motion and neck supple.   Neurological:      General: No focal deficit present.      Mental Status: He is alert.   Psychiatric:         Mood and Affect: Mood normal.        This note was created with voice-recognition software and was not corrected for typographical or grammatical errors

## 2024-09-11 ENCOUNTER — LAB (OUTPATIENT)
Dept: LAB | Facility: LAB | Age: 84
End: 2024-09-11
Payer: MEDICARE

## 2024-09-11 DIAGNOSIS — E11.9 TYPE 2 DIABETES MELLITUS WITHOUT COMPLICATION, UNSPECIFIED WHETHER LONG TERM INSULIN USE (MULTI): ICD-10-CM

## 2024-09-11 LAB
ALBUMIN SERPL BCP-MCNC: 3.9 G/DL (ref 3.4–5)
ALP SERPL-CCNC: 93 U/L (ref 33–136)
ALT SERPL W P-5'-P-CCNC: 12 U/L (ref 10–52)
ANION GAP SERPL CALC-SCNC: 12 MMOL/L (ref 10–20)
AST SERPL W P-5'-P-CCNC: 15 U/L (ref 9–39)
BILIRUB SERPL-MCNC: 0.7 MG/DL (ref 0–1.2)
BUN SERPL-MCNC: 22 MG/DL (ref 6–23)
CALCIUM SERPL-MCNC: 8.9 MG/DL (ref 8.6–10.3)
CHLORIDE SERPL-SCNC: 103 MMOL/L (ref 98–107)
CHOLEST SERPL-MCNC: 100 MG/DL (ref 0–199)
CHOLESTEROL/HDL RATIO: 2.7
CO2 SERPL-SCNC: 28 MMOL/L (ref 21–32)
CREAT SERPL-MCNC: 1.11 MG/DL (ref 0.5–1.3)
EGFRCR SERPLBLD CKD-EPI 2021: 66 ML/MIN/1.73M*2
ERYTHROCYTE [DISTWIDTH] IN BLOOD BY AUTOMATED COUNT: 12.6 % (ref 11.5–14.5)
EST. AVERAGE GLUCOSE BLD GHB EST-MCNC: 128 MG/DL
GLUCOSE SERPL-MCNC: 118 MG/DL (ref 74–99)
HBA1C MFR BLD: 6.1 %
HCT VFR BLD AUTO: 38.3 % (ref 41–52)
HDLC SERPL-MCNC: 37.6 MG/DL
HGB BLD-MCNC: 12.2 G/DL (ref 13.5–17.5)
LDLC SERPL CALC-MCNC: 44 MG/DL
MCH RBC QN AUTO: 31.4 PG (ref 26–34)
MCHC RBC AUTO-ENTMCNC: 31.9 G/DL (ref 32–36)
MCV RBC AUTO: 99 FL (ref 80–100)
NON HDL CHOLESTEROL: 62 MG/DL (ref 0–149)
NRBC BLD-RTO: 0 /100 WBCS (ref 0–0)
PLATELET # BLD AUTO: 265 X10*3/UL (ref 150–450)
POTASSIUM SERPL-SCNC: 4.2 MMOL/L (ref 3.5–5.3)
PROT SERPL-MCNC: 6.2 G/DL (ref 6.4–8.2)
RBC # BLD AUTO: 3.89 X10*6/UL (ref 4.5–5.9)
SODIUM SERPL-SCNC: 139 MMOL/L (ref 136–145)
TRIGL SERPL-MCNC: 91 MG/DL (ref 0–149)
VIT B12 SERPL-MCNC: 511 PG/ML (ref 211–911)
VLDL: 18 MG/DL (ref 0–40)
WBC # BLD AUTO: 10.2 X10*3/UL (ref 4.4–11.3)

## 2024-09-11 PROCEDURE — 83036 HEMOGLOBIN GLYCOSYLATED A1C: CPT

## 2024-09-11 PROCEDURE — 36415 COLL VENOUS BLD VENIPUNCTURE: CPT

## 2024-09-11 PROCEDURE — 82607 VITAMIN B-12: CPT

## 2024-09-11 PROCEDURE — 80053 COMPREHEN METABOLIC PANEL: CPT

## 2024-09-11 PROCEDURE — 80061 LIPID PANEL: CPT

## 2024-09-11 PROCEDURE — 85027 COMPLETE CBC AUTOMATED: CPT

## 2024-09-18 ENCOUNTER — APPOINTMENT (OUTPATIENT)
Dept: PRIMARY CARE | Facility: CLINIC | Age: 84
End: 2024-09-18
Payer: MEDICARE

## 2024-09-18 VITALS
HEART RATE: 60 BPM | DIASTOLIC BLOOD PRESSURE: 78 MMHG | BODY MASS INDEX: 26.98 KG/M2 | HEIGHT: 68 IN | OXYGEN SATURATION: 100 % | SYSTOLIC BLOOD PRESSURE: 118 MMHG | TEMPERATURE: 97.8 F | WEIGHT: 178 LBS | RESPIRATION RATE: 16 BRPM

## 2024-09-18 DIAGNOSIS — E11.9 TYPE 2 DIABETES MELLITUS WITHOUT COMPLICATION, UNSPECIFIED WHETHER LONG TERM INSULIN USE (MULTI): ICD-10-CM

## 2024-09-18 DIAGNOSIS — E78.41 ELEVATED LIPOPROTEIN(A): ICD-10-CM

## 2024-09-18 DIAGNOSIS — I25.10 CORONARY ARTERY DISEASE INVOLVING NATIVE CORONARY ARTERY OF NATIVE HEART WITHOUT ANGINA PECTORIS: Primary | ICD-10-CM

## 2024-09-18 DIAGNOSIS — I73.9 PVD (PERIPHERAL VASCULAR DISEASE) (CMS-HCC): ICD-10-CM

## 2024-09-18 DIAGNOSIS — I48.0 PAROXYSMAL ATRIAL FIBRILLATION (MULTI): ICD-10-CM

## 2024-09-18 DIAGNOSIS — I10 ESSENTIAL HYPERTENSION: ICD-10-CM

## 2024-09-18 PROCEDURE — 1036F TOBACCO NON-USER: CPT | Performed by: INTERNAL MEDICINE

## 2024-09-18 PROCEDURE — 1123F ACP DISCUSS/DSCN MKR DOCD: CPT | Performed by: INTERNAL MEDICINE

## 2024-09-18 PROCEDURE — 3078F DIAST BP <80 MM HG: CPT | Performed by: INTERNAL MEDICINE

## 2024-09-18 PROCEDURE — 1159F MED LIST DOCD IN RCRD: CPT | Performed by: INTERNAL MEDICINE

## 2024-09-18 PROCEDURE — 1157F ADVNC CARE PLAN IN RCRD: CPT | Performed by: INTERNAL MEDICINE

## 2024-09-18 PROCEDURE — 99214 OFFICE O/P EST MOD 30 MIN: CPT | Performed by: INTERNAL MEDICINE

## 2024-09-18 PROCEDURE — 1160F RVW MEDS BY RX/DR IN RCRD: CPT | Performed by: INTERNAL MEDICINE

## 2024-09-18 PROCEDURE — 3074F SYST BP LT 130 MM HG: CPT | Performed by: INTERNAL MEDICINE

## 2024-09-18 ASSESSMENT — ENCOUNTER SYMPTOMS
COUGH: 0
FATIGUE: 0
SHORTNESS OF BREATH: 0
FEVER: 0

## 2024-09-18 ASSESSMENT — PATIENT HEALTH QUESTIONNAIRE - PHQ9
2. FEELING DOWN, DEPRESSED OR HOPELESS: NOT AT ALL
SUM OF ALL RESPONSES TO PHQ9 QUESTIONS 1 AND 2: 0
1. LITTLE INTEREST OR PLEASURE IN DOING THINGS: NOT AT ALL

## 2024-09-18 NOTE — PROGRESS NOTES
"Subjective   Artis Horowitz is a 84 y.o. male who presents for 3 month Diabetes follow up.    HPI   Denies adverse sx's r/t DM, HTN.  Taking meds as Rx'd.  Attempts lower sugar/carb diet.    Review of Systems   Constitutional:  Negative for fatigue and fever.   Respiratory:  Negative for cough and shortness of breath.    Cardiovascular:  Negative for chest pain and leg swelling.   All other systems reviewed and are negative.      Health Maintenance Due   Topic Date Due    DTaP/Tdap/Td Vaccines (1 - Tdap) Never done    RSV Pregnant patients and/or  patients aged 60+ years (1 - 1-dose 60+ series) Never done    Zoster Vaccines (2 of 3) 08/03/2011    Diabetes: Retinopathy Screening  07/12/2022    Pneumococcal Vaccine: 65+ Years (2 of 2 - PCV) 04/14/2023    Influenza Vaccine (1) 09/01/2024    COVID-19 Vaccine (7 - 2023-24 season) 09/01/2024       Objective   /78   Pulse 60   Temp 36.6 °C (97.8 °F)   Resp 16   Ht 1.715 m (5' 7.5\")   Wt 80.7 kg (178 lb)   SpO2 100%   BMI 27.47 kg/m²     Physical Exam  Vitals and nursing note reviewed.   Constitutional:       Appearance: Normal appearance.   HENT:      Head: Normocephalic.   Eyes:      Conjunctiva/sclera: Conjunctivae normal.      Pupils: Pupils are equal, round, and reactive to light.   Cardiovascular:      Rate and Rhythm: Normal rate and regular rhythm.      Pulses: Normal pulses.      Heart sounds: Normal heart sounds.   Pulmonary:      Effort: Pulmonary effort is normal.      Breath sounds: Normal breath sounds.   Musculoskeletal:         General: No swelling.      Cervical back: Neck supple.   Skin:     General: Skin is warm and dry.   Neurological:      General: No focal deficit present.      Mental Status: He is oriented to person, place, and time.         Assessment/Plan   Problem List Items Addressed This Visit       Hyperlipidemia    Essential hypertension    Coronary artery disease involving native coronary artery of native heart without angina " pectoris - Primary    Paroxysmal atrial fibrillation (Multi)    PVD (peripheral vascular disease) (CMS-HCC)     Other Visit Diagnoses       Type 2 diabetes mellitus without complication, unspecified whether long term insulin use (Multi)        Relevant Orders    Lipid Panel    CBC    Comprehensive Metabolic Panel    Hemoglobin A1C    Vitamin B12        Reviewed labs   Cont meds  Stable based on symptoms and exam.  Continue established treatment plan and follow up at least yearly.

## 2024-12-02 DIAGNOSIS — I48.0 PAROXYSMAL ATRIAL FIBRILLATION (MULTI): ICD-10-CM

## 2024-12-02 DIAGNOSIS — I10 ESSENTIAL HYPERTENSION: ICD-10-CM

## 2024-12-02 RX ORDER — METOPROLOL SUCCINATE 25 MG/1
25 TABLET, EXTENDED RELEASE ORAL DAILY
Qty: 90 TABLET | Refills: 3 | Status: SHIPPED | OUTPATIENT
Start: 2024-12-02

## 2024-12-02 RX ORDER — APIXABAN 5 MG/1
5 TABLET, FILM COATED ORAL 2 TIMES DAILY
Qty: 180 TABLET | Refills: 3 | Status: SHIPPED | OUTPATIENT
Start: 2024-12-02

## 2024-12-02 NOTE — TELEPHONE ENCOUNTER
Received request for prescription refills for patient.   Patient follows with Dr. Arnett    Request is for Eliquis  Is patient currently on medication yes    Last OV 6/27/24  Next OV 6/26/25    Pended for signing and sent to provider

## 2024-12-03 ENCOUNTER — LAB (OUTPATIENT)
Dept: LAB | Facility: LAB | Age: 84
End: 2024-12-03
Payer: MEDICARE

## 2024-12-03 DIAGNOSIS — E11.9 TYPE 2 DIABETES MELLITUS WITHOUT COMPLICATION, UNSPECIFIED WHETHER LONG TERM INSULIN USE (MULTI): ICD-10-CM

## 2024-12-03 LAB
ALBUMIN SERPL BCP-MCNC: 3.9 G/DL (ref 3.4–5)
ALP SERPL-CCNC: 99 U/L (ref 33–136)
ALT SERPL W P-5'-P-CCNC: 13 U/L (ref 10–52)
ANION GAP SERPL CALC-SCNC: 10 MMOL/L (ref 10–20)
AST SERPL W P-5'-P-CCNC: 15 U/L (ref 9–39)
BILIRUB SERPL-MCNC: 0.5 MG/DL (ref 0–1.2)
BUN SERPL-MCNC: 19 MG/DL (ref 6–23)
CALCIUM SERPL-MCNC: 8.8 MG/DL (ref 8.6–10.3)
CHLORIDE SERPL-SCNC: 103 MMOL/L (ref 98–107)
CHOLEST SERPL-MCNC: 101 MG/DL (ref 0–199)
CHOLESTEROL/HDL RATIO: 2.5
CO2 SERPL-SCNC: 31 MMOL/L (ref 21–32)
CREAT SERPL-MCNC: 1.04 MG/DL (ref 0.5–1.3)
EGFRCR SERPLBLD CKD-EPI 2021: 71 ML/MIN/1.73M*2
ERYTHROCYTE [DISTWIDTH] IN BLOOD BY AUTOMATED COUNT: 12.7 % (ref 11.5–14.5)
EST. AVERAGE GLUCOSE BLD GHB EST-MCNC: 128 MG/DL
GLUCOSE SERPL-MCNC: 111 MG/DL (ref 74–99)
HBA1C MFR BLD: 6.1 %
HCT VFR BLD AUTO: 37.8 % (ref 41–52)
HDLC SERPL-MCNC: 40.2 MG/DL
HGB BLD-MCNC: 12 G/DL (ref 13.5–17.5)
LDLC SERPL CALC-MCNC: 47 MG/DL
MCH RBC QN AUTO: 31.4 PG (ref 26–34)
MCHC RBC AUTO-ENTMCNC: 31.7 G/DL (ref 32–36)
MCV RBC AUTO: 99 FL (ref 80–100)
NON HDL CHOLESTEROL: 61 MG/DL (ref 0–149)
NRBC BLD-RTO: 0 /100 WBCS (ref 0–0)
PLATELET # BLD AUTO: 293 X10*3/UL (ref 150–450)
POTASSIUM SERPL-SCNC: 4.5 MMOL/L (ref 3.5–5.3)
PROT SERPL-MCNC: 6.3 G/DL (ref 6.4–8.2)
RBC # BLD AUTO: 3.82 X10*6/UL (ref 4.5–5.9)
SODIUM SERPL-SCNC: 139 MMOL/L (ref 136–145)
TRIGL SERPL-MCNC: 70 MG/DL (ref 0–149)
VIT B12 SERPL-MCNC: 607 PG/ML (ref 211–911)
VLDL: 14 MG/DL (ref 0–40)
WBC # BLD AUTO: 9.5 X10*3/UL (ref 4.4–11.3)

## 2024-12-03 PROCEDURE — 80053 COMPREHEN METABOLIC PANEL: CPT

## 2024-12-03 PROCEDURE — 36415 COLL VENOUS BLD VENIPUNCTURE: CPT

## 2024-12-03 PROCEDURE — 83036 HEMOGLOBIN GLYCOSYLATED A1C: CPT

## 2024-12-03 PROCEDURE — 80061 LIPID PANEL: CPT

## 2024-12-03 PROCEDURE — 85027 COMPLETE CBC AUTOMATED: CPT

## 2024-12-03 PROCEDURE — 82607 VITAMIN B-12: CPT

## 2024-12-04 ENCOUNTER — LAB (OUTPATIENT)
Dept: LAB | Facility: LAB | Age: 84
End: 2024-12-04
Payer: MEDICARE

## 2024-12-04 DIAGNOSIS — M06.031: Primary | ICD-10-CM

## 2024-12-04 DIAGNOSIS — M06.09 RHEUMATOID ARTHRITIS WITHOUT RHEUMATOID FACTOR, MULTIPLE SITES (MULTI): ICD-10-CM

## 2024-12-04 LAB
ALBUMIN SERPL BCP-MCNC: 3.8 G/DL (ref 3.4–5)
ALP SERPL-CCNC: 98 U/L (ref 33–136)
ALT SERPL W P-5'-P-CCNC: 13 U/L (ref 10–52)
ANION GAP SERPL CALC-SCNC: 9 MMOL/L (ref 10–20)
AST SERPL W P-5'-P-CCNC: 15 U/L (ref 9–39)
BILIRUB SERPL-MCNC: 0.5 MG/DL (ref 0–1.2)
BUN SERPL-MCNC: 20 MG/DL (ref 6–23)
CALCIUM SERPL-MCNC: 8.7 MG/DL (ref 8.6–10.3)
CHLORIDE SERPL-SCNC: 103 MMOL/L (ref 98–107)
CO2 SERPL-SCNC: 30 MMOL/L (ref 21–32)
CREAT SERPL-MCNC: 0.98 MG/DL (ref 0.5–1.3)
CRP SERPL-MCNC: 1.27 MG/DL
EGFRCR SERPLBLD CKD-EPI 2021: 77 ML/MIN/1.73M*2
ERYTHROCYTE [DISTWIDTH] IN BLOOD BY AUTOMATED COUNT: 12.6 % (ref 11.5–14.5)
ERYTHROCYTE [SEDIMENTATION RATE] IN BLOOD BY WESTERGREN METHOD: 15 MM/H (ref 0–20)
GLUCOSE SERPL-MCNC: 100 MG/DL (ref 74–99)
HCT VFR BLD AUTO: 36.5 % (ref 41–52)
HGB BLD-MCNC: 11.8 G/DL (ref 13.5–17.5)
MCH RBC QN AUTO: 31.6 PG (ref 26–34)
MCHC RBC AUTO-ENTMCNC: 32.3 G/DL (ref 32–36)
MCV RBC AUTO: 98 FL (ref 80–100)
NRBC BLD-RTO: 0 /100 WBCS (ref 0–0)
PLATELET # BLD AUTO: 274 X10*3/UL (ref 150–450)
POTASSIUM SERPL-SCNC: 4.3 MMOL/L (ref 3.5–5.3)
PROT SERPL-MCNC: 6.1 G/DL (ref 6.4–8.2)
RBC # BLD AUTO: 3.74 X10*6/UL (ref 4.5–5.9)
SODIUM SERPL-SCNC: 138 MMOL/L (ref 136–145)
WBC # BLD AUTO: 9.7 X10*3/UL (ref 4.4–11.3)

## 2024-12-04 PROCEDURE — 80053 COMPREHEN METABOLIC PANEL: CPT

## 2024-12-04 PROCEDURE — 36415 COLL VENOUS BLD VENIPUNCTURE: CPT

## 2024-12-04 PROCEDURE — 85027 COMPLETE CBC AUTOMATED: CPT

## 2024-12-04 PROCEDURE — 86140 C-REACTIVE PROTEIN: CPT

## 2024-12-04 PROCEDURE — 85652 RBC SED RATE AUTOMATED: CPT

## 2024-12-17 ENCOUNTER — APPOINTMENT (OUTPATIENT)
Dept: PRIMARY CARE | Facility: CLINIC | Age: 84
End: 2024-12-17
Payer: MEDICARE

## 2024-12-17 VITALS
DIASTOLIC BLOOD PRESSURE: 68 MMHG | HEIGHT: 68 IN | TEMPERATURE: 98.2 F | OXYGEN SATURATION: 99 % | RESPIRATION RATE: 16 BRPM | BODY MASS INDEX: 26.83 KG/M2 | WEIGHT: 177 LBS | SYSTOLIC BLOOD PRESSURE: 110 MMHG | HEART RATE: 68 BPM

## 2024-12-17 DIAGNOSIS — I10 ESSENTIAL HYPERTENSION: ICD-10-CM

## 2024-12-17 DIAGNOSIS — Z12.11 SCREEN FOR COLON CANCER: Primary | ICD-10-CM

## 2024-12-17 DIAGNOSIS — I48.0 PAROXYSMAL ATRIAL FIBRILLATION (MULTI): ICD-10-CM

## 2024-12-17 DIAGNOSIS — D63.8 ANEMIA IN OTHER CHRONIC DISEASES CLASSIFIED ELSEWHERE: ICD-10-CM

## 2024-12-17 DIAGNOSIS — R80.9: ICD-10-CM

## 2024-12-17 DIAGNOSIS — E78.41 ELEVATED LIPOPROTEIN(A): ICD-10-CM

## 2024-12-17 DIAGNOSIS — E08.29: ICD-10-CM

## 2024-12-17 PROCEDURE — 1159F MED LIST DOCD IN RCRD: CPT | Performed by: INTERNAL MEDICINE

## 2024-12-17 PROCEDURE — 99214 OFFICE O/P EST MOD 30 MIN: CPT | Performed by: INTERNAL MEDICINE

## 2024-12-17 PROCEDURE — 1123F ACP DISCUSS/DSCN MKR DOCD: CPT | Performed by: INTERNAL MEDICINE

## 2024-12-17 PROCEDURE — 1036F TOBACCO NON-USER: CPT | Performed by: INTERNAL MEDICINE

## 2024-12-17 PROCEDURE — 1160F RVW MEDS BY RX/DR IN RCRD: CPT | Performed by: INTERNAL MEDICINE

## 2024-12-17 PROCEDURE — 3074F SYST BP LT 130 MM HG: CPT | Performed by: INTERNAL MEDICINE

## 2024-12-17 PROCEDURE — 3078F DIAST BP <80 MM HG: CPT | Performed by: INTERNAL MEDICINE

## 2024-12-17 PROCEDURE — 1157F ADVNC CARE PLAN IN RCRD: CPT | Performed by: INTERNAL MEDICINE

## 2024-12-17 ASSESSMENT — PATIENT HEALTH QUESTIONNAIRE - PHQ9
1. LITTLE INTEREST OR PLEASURE IN DOING THINGS: NOT AT ALL
2. FEELING DOWN, DEPRESSED OR HOPELESS: NOT AT ALL
SUM OF ALL RESPONSES TO PHQ9 QUESTIONS 1 AND 2: 0

## 2024-12-17 ASSESSMENT — ENCOUNTER SYMPTOMS
FATIGUE: 0
FEVER: 0
SHORTNESS OF BREATH: 0
COUGH: 0

## 2024-12-17 NOTE — PROGRESS NOTES
"Subjective   Artis Horowitz is a 84 y.o. male who presents for 6 month Follow-up.    HPI   Denies adverse sx's r/t HTN, DM.  Taking meds as Rx'd.    Scheduled for Moh's procedure to L ear next month.    Review of Systems   Constitutional:  Negative for fatigue and fever.   Respiratory:  Negative for cough and shortness of breath.    Cardiovascular:  Negative for chest pain and leg swelling.   All other systems reviewed and are negative.      Health Maintenance Due   Topic Date Due    DTaP/Tdap/Td Vaccines (1 - Tdap) Never done    RSV High Risk: (Elderly (60+) or Pregnant Population) (1 - 1-dose 75+ series) Never done    Diabetes: Retinopathy Screening  07/12/2022    Pneumococcal Vaccine: 65+ Years (2 of 2 - PCV) 04/14/2023       Objective   /68   Pulse 68   Temp 36.8 °C (98.2 °F)   Resp 16   Ht 1.715 m (5' 7.5\")   Wt 80.3 kg (177 lb)   SpO2 99%   BMI 27.31 kg/m²     Physical Exam  Vitals and nursing note reviewed.   Constitutional:       Appearance: Normal appearance.   HENT:      Head: Normocephalic.   Eyes:      Conjunctiva/sclera: Conjunctivae normal.      Pupils: Pupils are equal, round, and reactive to light.   Cardiovascular:      Rate and Rhythm: Normal rate and regular rhythm.      Pulses: Normal pulses.      Heart sounds: Normal heart sounds.   Pulmonary:      Effort: Pulmonary effort is normal.      Breath sounds: Normal breath sounds.   Musculoskeletal:         General: No swelling.      Cervical back: Neck supple.   Skin:     General: Skin is warm and dry.   Neurological:      General: No focal deficit present.      Mental Status: He is oriented to person, place, and time.         Assessment/Plan   Problem List Items Addressed This Visit       Hyperlipidemia    Essential hypertension    Diabetes mellitus due to underlying condition, controlled, with microalbuminuria (Multi)    Paroxysmal atrial fibrillation (Multi)    Anemia in other chronic diseases classified elsewhere     Other Visit " Diagnoses       Screen for colon cancer    -  Primary    Relevant Orders    Colonoscopy Screening; High Risk Patient        Reviewed labs  Cont meds  Stable based on symptoms and exam.  Continue established treatment plan and follow up at least yearly.  Refer for colonoscopy

## 2024-12-27 ENCOUNTER — OFFICE VISIT (OUTPATIENT)
Dept: PRIMARY CARE | Facility: CLINIC | Age: 84
End: 2024-12-27
Payer: MEDICARE

## 2024-12-27 VITALS
RESPIRATION RATE: 18 BRPM | SYSTOLIC BLOOD PRESSURE: 106 MMHG | TEMPERATURE: 98.4 F | WEIGHT: 179 LBS | BODY MASS INDEX: 27.62 KG/M2 | DIASTOLIC BLOOD PRESSURE: 70 MMHG | HEART RATE: 84 BPM | OXYGEN SATURATION: 98 %

## 2024-12-27 DIAGNOSIS — H61.23 BILATERAL IMPACTED CERUMEN: Primary | ICD-10-CM

## 2024-12-27 PROCEDURE — 3074F SYST BP LT 130 MM HG: CPT | Performed by: NURSE PRACTITIONER

## 2024-12-27 PROCEDURE — 69209 REMOVE IMPACTED EAR WAX UNI: CPT | Performed by: NURSE PRACTITIONER

## 2024-12-27 PROCEDURE — 3078F DIAST BP <80 MM HG: CPT | Performed by: NURSE PRACTITIONER

## 2024-12-27 PROCEDURE — 1157F ADVNC CARE PLAN IN RCRD: CPT | Performed by: NURSE PRACTITIONER

## 2024-12-27 PROCEDURE — 99213 OFFICE O/P EST LOW 20 MIN: CPT | Performed by: NURSE PRACTITIONER

## 2024-12-27 PROCEDURE — 1036F TOBACCO NON-USER: CPT | Performed by: NURSE PRACTITIONER

## 2024-12-27 PROCEDURE — 1160F RVW MEDS BY RX/DR IN RCRD: CPT | Performed by: NURSE PRACTITIONER

## 2024-12-27 PROCEDURE — 1159F MED LIST DOCD IN RCRD: CPT | Performed by: NURSE PRACTITIONER

## 2024-12-27 PROCEDURE — 1123F ACP DISCUSS/DSCN MKR DOCD: CPT | Performed by: NURSE PRACTITIONER

## 2024-12-27 ASSESSMENT — ENCOUNTER SYMPTOMS
SORE THROAT: 0
VOMITING: 0
ACTIVITY CHANGE: 0
APPETITE CHANGE: 0
COUGH: 0
SLEEP DISTURBANCE: 0
HEADACHES: 0
CONSTIPATION: 0
NAUSEA: 0
FEVER: 0
DIARRHEA: 0
CHILLS: 0

## 2024-12-27 NOTE — PROGRESS NOTES
Subjective   Patient ID: Artis Horowitz is a 84 y.o. male who presents for Ear Fullness.    Bilateral ear pain x1 week  Went for hearing aid check and told to get ears cleaned  No fever or chuills  No cold symptoms      Ear Fullness   There is pain in both ears. Episode onset: 1 week. Associated symptoms include hearing loss. Pertinent negatives include no coughing, diarrhea, headaches, sore throat or vomiting.        Review of Systems   Constitutional:  Negative for activity change, appetite change, chills and fever.   HENT:  Positive for hearing loss. Negative for congestion and sore throat.    Respiratory:  Negative for cough.    Gastrointestinal:  Negative for constipation, diarrhea, nausea and vomiting.   Neurological:  Negative for headaches.   Psychiatric/Behavioral:  Negative for sleep disturbance.        Objective   /70   Pulse 84   Temp 36.9 °C (98.4 °F)   Resp 18   Wt 81.2 kg (179 lb)   SpO2 98%   BMI 27.62 kg/m²     Physical Exam  Constitutional:       General: He is not in acute distress.     Appearance: Normal appearance. He is not ill-appearing or toxic-appearing.   HENT:      Head: Normocephalic.      Right Ear: Ear canal normal. There is impacted cerumen.      Left Ear: Ear canal normal. There is impacted cerumen.   Cardiovascular:      Rate and Rhythm: Normal rate and regular rhythm.      Pulses: Normal pulses.      Heart sounds: Normal heart sounds.   Pulmonary:      Effort: Pulmonary effort is normal.      Breath sounds: Normal breath sounds.   Musculoskeletal:      Cervical back: Normal range of motion and neck supple.   Skin:     General: Skin is warm and dry.      Capillary Refill: Capillary refill takes less than 2 seconds.   Neurological:      General: No focal deficit present.      Mental Status: He is alert and oriented to person, place, and time.   Psychiatric:         Mood and Affect: Mood normal.         Behavior: Behavior normal.     Patient ID: Artis Horowitz is a 84  y.o. male.    Ear Cerumen Removal    Date/Time: 12/27/2024 12:02 PM    Performed by: JONAH Armstrong  Authorized by: JONAH Armstrong    Consent:     Consent obtained:  Verbal    Consent given by:  Patient    Risks, benefits, and alternatives were discussed: yes      Risks discussed:  Pain, incomplete removal, dizziness and bleeding    Alternatives discussed:  No treatment  Universal protocol:     Procedure explained and questions answered to patient or proxy's satisfaction: yes      Relevant documents present and verified: yes      Test results available: no      Imaging studies available: no      Required blood products, implants, devices, and special equipment available: yes      Site/side marked: yes      Immediately prior to procedure, a time out was called: yes      Patient identity confirmed:  Verbally with patient  Procedure details:     Location:  L ear and R ear    Procedure type: irrigation      Procedure outcomes: cerumen removed    Post-procedure details:     Inspection:  Ear canal clear    Hearing quality:  Improved    Procedure completion:  Tolerated      Assessment/Plan   Diagnoses and all orders for this visit:  Bilateral impacted cerumen    Bilateral ears irrigated successfully. Pt tolerated well  Recommended OTC Debrox to maintain  Follow up PCP as needed

## 2025-01-31 DIAGNOSIS — E78.41 ELEVATED LIPOPROTEIN(A): ICD-10-CM

## 2025-01-31 RX ORDER — SIMVASTATIN 40 MG/1
40 TABLET, FILM COATED ORAL NIGHTLY
Qty: 90 TABLET | Refills: 3 | Status: SHIPPED | OUTPATIENT
Start: 2025-01-31 | End: 2026-01-31

## 2025-03-02 DIAGNOSIS — E11.69 TYPE 2 DIABETES MELLITUS WITH OTHER SPECIFIED COMPLICATION, WITH LONG-TERM CURRENT USE OF INSULIN: ICD-10-CM

## 2025-03-02 DIAGNOSIS — Z79.4 TYPE 2 DIABETES MELLITUS WITH OTHER SPECIFIED COMPLICATION, WITH LONG-TERM CURRENT USE OF INSULIN: ICD-10-CM

## 2025-03-03 RX ORDER — METFORMIN HYDROCHLORIDE 500 MG/1
500 TABLET, EXTENDED RELEASE ORAL DAILY
Qty: 90 TABLET | Refills: 3 | Status: SHIPPED | OUTPATIENT
Start: 2025-03-03

## 2025-04-01 DIAGNOSIS — I10 ESSENTIAL (PRIMARY) HYPERTENSION: ICD-10-CM

## 2025-04-03 RX ORDER — LOSARTAN POTASSIUM AND HYDROCHLOROTHIAZIDE 25; 100 MG/1; MG/1
1 TABLET ORAL DAILY
Qty: 90 TABLET | Refills: 2 | Status: SHIPPED | OUTPATIENT
Start: 2025-04-03

## 2025-04-15 ENCOUNTER — TELEPHONE (OUTPATIENT)
Dept: PEDIATRICS | Facility: CLINIC | Age: 85
End: 2025-04-15
Payer: MEDICARE

## 2025-04-15 DIAGNOSIS — N40.1 BENIGN PROSTATIC HYPERPLASIA WITH LOWER URINARY TRACT SYMPTOMS, SYMPTOM DETAILS UNSPECIFIED: ICD-10-CM

## 2025-04-15 RX ORDER — TAMSULOSIN HYDROCHLORIDE 0.4 MG/1
0.4 CAPSULE ORAL DAILY
Qty: 90 CAPSULE | Refills: 3 | Status: SHIPPED | OUTPATIENT
Start: 2025-04-15 | End: 2026-04-15

## 2025-04-15 NOTE — TELEPHONE ENCOUNTER
Rx Refill Request Telephone Encounter    Name:  Artis Horowitz  :  [id number]  Medication Name:  tamsulosin (Flomax) 0.4 mg 24 hr capsule   Specific Pharmacy location:  [facility] [id number] [place] [address] [address]   Date of last appointment:  24  Date of next appointment:  25  Best number to reach patient:  [phone]

## 2025-04-23 ENCOUNTER — APPOINTMENT (OUTPATIENT)
Dept: PRIMARY CARE | Facility: CLINIC | Age: 85
End: 2025-04-23
Payer: MEDICARE

## 2025-04-23 VITALS
TEMPERATURE: 98.2 F | OXYGEN SATURATION: 97 % | WEIGHT: 173 LBS | RESPIRATION RATE: 16 BRPM | SYSTOLIC BLOOD PRESSURE: 116 MMHG | BODY MASS INDEX: 26.22 KG/M2 | DIASTOLIC BLOOD PRESSURE: 68 MMHG | HEIGHT: 68 IN | HEART RATE: 72 BPM

## 2025-04-23 DIAGNOSIS — M05.79 RHEUMATOID ARTHRITIS INVOLVING MULTIPLE SITES WITH POSITIVE RHEUMATOID FACTOR (MULTI): ICD-10-CM

## 2025-04-23 DIAGNOSIS — M06.9 RHEUMATOID ARTHRITIS INVOLVING MULTIPLE SITES, UNSPECIFIED WHETHER RHEUMATOID FACTOR PRESENT (MULTI): ICD-10-CM

## 2025-04-23 DIAGNOSIS — M06.09 RHEUMATOID ARTHRITIS WITHOUT RHEUMATOID FACTOR, MULTIPLE SITES (MULTI): ICD-10-CM

## 2025-04-23 DIAGNOSIS — I49.9 IRREGULAR HEARTBEAT: ICD-10-CM

## 2025-04-23 DIAGNOSIS — E11.9 TYPE 2 DIABETES MELLITUS WITHOUT COMPLICATION, UNSPECIFIED WHETHER LONG TERM INSULIN USE: Primary | ICD-10-CM

## 2025-04-23 DIAGNOSIS — I48.0 PAROXYSMAL ATRIAL FIBRILLATION (MULTI): ICD-10-CM

## 2025-04-23 PROCEDURE — 1160F RVW MEDS BY RX/DR IN RCRD: CPT | Performed by: INTERNAL MEDICINE

## 2025-04-23 PROCEDURE — 3078F DIAST BP <80 MM HG: CPT | Performed by: INTERNAL MEDICINE

## 2025-04-23 PROCEDURE — 99214 OFFICE O/P EST MOD 30 MIN: CPT | Performed by: INTERNAL MEDICINE

## 2025-04-23 PROCEDURE — G2211 COMPLEX E/M VISIT ADD ON: HCPCS | Performed by: INTERNAL MEDICINE

## 2025-04-23 PROCEDURE — 1158F ADVNC CARE PLAN TLK DOCD: CPT | Performed by: INTERNAL MEDICINE

## 2025-04-23 PROCEDURE — 3074F SYST BP LT 130 MM HG: CPT | Performed by: INTERNAL MEDICINE

## 2025-04-23 PROCEDURE — 1123F ACP DISCUSS/DSCN MKR DOCD: CPT | Performed by: INTERNAL MEDICINE

## 2025-04-23 PROCEDURE — 93000 ELECTROCARDIOGRAM COMPLETE: CPT | Performed by: INTERNAL MEDICINE

## 2025-04-23 PROCEDURE — 1157F ADVNC CARE PLAN IN RCRD: CPT | Performed by: INTERNAL MEDICINE

## 2025-04-23 PROCEDURE — 1159F MED LIST DOCD IN RCRD: CPT | Performed by: INTERNAL MEDICINE

## 2025-04-23 PROCEDURE — 1036F TOBACCO NON-USER: CPT | Performed by: INTERNAL MEDICINE

## 2025-04-23 ASSESSMENT — ENCOUNTER SYMPTOMS
COUGH: 0
FATIGUE: 0
FEVER: 0
SHORTNESS OF BREATH: 0

## 2025-04-23 ASSESSMENT — PATIENT HEALTH QUESTIONNAIRE - PHQ9
1. LITTLE INTEREST OR PLEASURE IN DOING THINGS: NOT AT ALL
SUM OF ALL RESPONSES TO PHQ9 QUESTIONS 1 AND 2: 0
2. FEELING DOWN, DEPRESSED OR HOPELESS: NOT AT ALL

## 2025-04-23 NOTE — PROGRESS NOTES
"Subjective   Artis Horowitz is a 84 y.o. male who presents for 4 month Follow-up.    HPI   Denies adverse sx's r/t HTN, DM.  Taking meds as Rx'd.    Review of Systems   Constitutional:  Negative for fatigue and fever.   Respiratory:  Negative for cough and shortness of breath.    Cardiovascular:  Negative for chest pain and leg swelling.   All other systems reviewed and are negative.      Health Maintenance Due   Topic Date Due    DTaP/Tdap/Td Vaccines (1 - Tdap) Never done    Pneumococcal Vaccine (2 of 2 - PCV) 04/14/2023    Diabetes: Retinopathy Screening  07/12/2023    Diabetes: Hemoglobin A1C  03/03/2025    Medicare Annual Wellness Visit (AWV)  06/20/2025       Objective   /68   Pulse 72   Temp 36.8 °C (98.2 °F)   Resp 16   Ht 1.715 m (5' 7.5\")   Wt 78.5 kg (173 lb)   SpO2 97%   BMI 26.70 kg/m²     Physical Exam  Vitals and nursing note reviewed.   Constitutional:       Appearance: Normal appearance.   HENT:      Head: Normocephalic.   Eyes:      Conjunctiva/sclera: Conjunctivae normal.      Pupils: Pupils are equal, round, and reactive to light.   Cardiovascular:      Rate and Rhythm: Normal rate and regular rhythm.      Pulses: Normal pulses.      Heart sounds: Normal heart sounds.   Pulmonary:      Effort: Pulmonary effort is normal.      Breath sounds: Normal breath sounds.   Musculoskeletal:         General: No swelling.      Cervical back: Neck supple.   Skin:     General: Skin is warm and dry.   Neurological:      General: No focal deficit present.      Mental Status: He is oriented to person, place, and time.         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)    Relevant Orders    Sedimentation Rate    C-reactive protein     Other Visit Diagnoses         Type 2 diabetes mellitus without complication, unspecified whether long term insulin use   "  -  Primary    Relevant Orders    Albumin-Creatinine Ratio, Urine Random    Lipid Panel    CBC    Comprehensive Metabolic Panel    Hemoglobin A1C      Rheumatoid arthritis without rheumatoid factor, multiple sites (Multi)          Rheumatoid arthritis involving multiple sites, unspecified whether rheumatoid factor present (Multi)          Irregular heartbeat        Relevant Orders    ECG 12 lead (Clinic Performed) (Completed)          Cont meds  Check labs  Stable based on symptoms and exam.  Continue established treatment plan and follow up at least yearly.

## 2025-04-29 LAB
ALBUMIN SERPL-MCNC: 4.1 G/DL (ref 3.6–5.1)
ALP SERPL-CCNC: 95 U/L (ref 35–144)
ALT SERPL-CCNC: 10 U/L (ref 9–46)
ANION GAP SERPL CALCULATED.4IONS-SCNC: 10 MMOL/L (CALC) (ref 7–17)
AST SERPL-CCNC: 14 U/L (ref 10–35)
BILIRUB SERPL-MCNC: 0.5 MG/DL (ref 0.2–1.2)
BUN SERPL-MCNC: 28 MG/DL (ref 7–25)
CALCIUM SERPL-MCNC: 9.2 MG/DL (ref 8.6–10.3)
CHLORIDE SERPL-SCNC: 103 MMOL/L (ref 98–110)
CHOLEST SERPL-MCNC: 110 MG/DL
CHOLEST/HDLC SERPL: 2.8 (CALC)
CO2 SERPL-SCNC: 27 MMOL/L (ref 20–32)
CREAT SERPL-MCNC: 1.06 MG/DL (ref 0.7–1.22)
CRP SERPL-MCNC: 14.7 MG/L
EGFRCR SERPLBLD CKD-EPI 2021: 70 ML/MIN/1.73M2
ERYTHROCYTE [DISTWIDTH] IN BLOOD BY AUTOMATED COUNT: 11.7 % (ref 11–15)
ERYTHROCYTE [SEDIMENTATION RATE] IN BLOOD BY WESTERGREN METHOD: 33 MM/H
EST. AVERAGE GLUCOSE BLD GHB EST-MCNC: 140 MG/DL
EST. AVERAGE GLUCOSE BLD GHB EST-SCNC: 7.7 MMOL/L
GLUCOSE SERPL-MCNC: 125 MG/DL (ref 65–99)
HBA1C MFR BLD: 6.5 %
HCT VFR BLD AUTO: 36.6 % (ref 38.5–50)
HDLC SERPL-MCNC: 40 MG/DL
HGB BLD-MCNC: 12.1 G/DL (ref 13.2–17.1)
LDLC SERPL CALC-MCNC: 55 MG/DL (CALC)
MCH RBC QN AUTO: 31.3 PG (ref 27–33)
MCHC RBC AUTO-ENTMCNC: 33.1 G/DL (ref 32–36)
MCV RBC AUTO: 94.6 FL (ref 80–100)
NONHDLC SERPL-MCNC: 70 MG/DL (CALC)
PLATELET # BLD AUTO: 293 THOUSAND/UL (ref 140–400)
PMV BLD REES-ECKER: 9.5 FL (ref 7.5–12.5)
POTASSIUM SERPL-SCNC: 4.2 MMOL/L (ref 3.5–5.3)
PROT SERPL-MCNC: 6.6 G/DL (ref 6.1–8.1)
RBC # BLD AUTO: 3.87 MILLION/UL (ref 4.2–5.8)
SODIUM SERPL-SCNC: 140 MMOL/L (ref 135–146)
TRIGL SERPL-MCNC: 73 MG/DL
WBC # BLD AUTO: 9.2 THOUSAND/UL (ref 3.8–10.8)

## 2025-05-01 ENCOUNTER — TELEPHONE (OUTPATIENT)
Dept: PRIMARY CARE | Facility: CLINIC | Age: 85
End: 2025-05-01
Payer: MEDICARE

## 2025-05-01 NOTE — TELEPHONE ENCOUNTER
----- Message from Nicolette Galeana sent at 5/1/2025 12:49 PM EDT -----  Call patient his labs look good  I'm glad he's going to see rheumatology , his inflammation markers are up a bit  His glucose is up a little  hbaic is 6.5  If it doesn't come down will add something next check  Also did they collect his urine at the lab? I dont see the results of the kidney check  ----- Message -----  From: Juan FriendCode Results In  Sent: 4/28/2025  11:16 PM EDT  To: Nicolette Galeana, DO

## 2025-05-06 LAB
ALBUMIN/CREAT UR: NORMAL MG/G CREAT
CREAT UR-MCNC: 50 MG/DL (ref 20–320)
MICROALBUMIN UR-MCNC: <0.2 MG/DL

## 2025-05-14 ENCOUNTER — TELEPHONE (OUTPATIENT)
Dept: PEDIATRICS | Facility: CLINIC | Age: 85
End: 2025-05-14
Payer: MEDICARE

## 2025-05-14 DIAGNOSIS — N40.0 BENIGN PROSTATIC HYPERPLASIA, UNSPECIFIED WHETHER LOWER URINARY TRACT SYMPTOMS PRESENT: ICD-10-CM

## 2025-05-14 RX ORDER — FINASTERIDE 5 MG/1
5 TABLET, FILM COATED ORAL DAILY
Qty: 90 TABLET | Refills: 3 | Status: SHIPPED | OUTPATIENT
Start: 2025-05-14

## 2025-05-14 NOTE — TELEPHONE ENCOUNTER
Rx Refill Request Telephone Encounter    Name:  Artis Horowitz  :  [id number]  Medication Name:  finasteride (Proscar) 5 mg tablet   Specific Pharmacy location:  [id number] [place] [id number]   Date of last appointment:  25  Date of next appointment:  25  Best number to reach patient:  [phone]

## 2025-05-28 ENCOUNTER — APPOINTMENT (OUTPATIENT)
Dept: RADIOLOGY | Facility: HOSPITAL | Age: 85
End: 2025-05-28
Payer: MEDICARE

## 2025-05-28 ENCOUNTER — TELEPHONE (OUTPATIENT)
Dept: PRIMARY CARE | Facility: CLINIC | Age: 85
End: 2025-05-28
Payer: MEDICARE

## 2025-05-28 ENCOUNTER — APPOINTMENT (OUTPATIENT)
Dept: CARDIOLOGY | Facility: HOSPITAL | Age: 85
End: 2025-05-28
Payer: MEDICARE

## 2025-05-28 ENCOUNTER — HOSPITAL ENCOUNTER (OUTPATIENT)
Facility: HOSPITAL | Age: 85
Setting detail: OBSERVATION
Discharge: HOME | End: 2025-05-29
Attending: STUDENT IN AN ORGANIZED HEALTH CARE EDUCATION/TRAINING PROGRAM | Admitting: STUDENT IN AN ORGANIZED HEALTH CARE EDUCATION/TRAINING PROGRAM
Payer: MEDICARE

## 2025-05-28 DIAGNOSIS — I48.0 PAROXYSMAL ATRIAL FIBRILLATION (MULTI): ICD-10-CM

## 2025-05-28 DIAGNOSIS — I10 ESSENTIAL HYPERTENSION: ICD-10-CM

## 2025-05-28 DIAGNOSIS — S22.41XA CLOSED FRACTURE OF MULTIPLE RIBS OF RIGHT SIDE, INITIAL ENCOUNTER: ICD-10-CM

## 2025-05-28 DIAGNOSIS — I25.10 CORONARY ARTERY DISEASE INVOLVING NATIVE CORONARY ARTERY OF NATIVE HEART WITHOUT ANGINA PECTORIS: ICD-10-CM

## 2025-05-28 DIAGNOSIS — R55 SYNCOPE AND COLLAPSE: Primary | ICD-10-CM

## 2025-05-28 LAB
ALBUMIN SERPL BCP-MCNC: 4.2 G/DL (ref 3.4–5)
ALP SERPL-CCNC: 85 U/L (ref 33–136)
ALT SERPL W P-5'-P-CCNC: 11 U/L (ref 10–52)
ANION GAP SERPL CALC-SCNC: 11 MMOL/L (ref 10–20)
APTT PPP: 37 SECONDS (ref 26–36)
AST SERPL W P-5'-P-CCNC: 15 U/L (ref 9–39)
ATRIAL RATE: 74 BPM
BASOPHILS # BLD AUTO: 0.05 X10*3/UL (ref 0–0.1)
BASOPHILS NFR BLD AUTO: 0.5 %
BILIRUB SERPL-MCNC: 0.6 MG/DL (ref 0–1.2)
BNP SERPL-MCNC: 136 PG/ML (ref 0–99)
BUN SERPL-MCNC: 29 MG/DL (ref 6–23)
CALCIUM SERPL-MCNC: 9.4 MG/DL (ref 8.6–10.3)
CARDIAC TROPONIN I PNL SERPL HS: 5 NG/L (ref 0–20)
CARDIAC TROPONIN I PNL SERPL HS: 6 NG/L (ref 0–20)
CHLORIDE SERPL-SCNC: 100 MMOL/L (ref 98–107)
CO2 SERPL-SCNC: 29 MMOL/L (ref 21–32)
CREAT SERPL-MCNC: 1.2 MG/DL (ref 0.5–1.3)
EGFRCR SERPLBLD CKD-EPI 2021: 60 ML/MIN/1.73M*2
EOSINOPHIL # BLD AUTO: 0.05 X10*3/UL (ref 0–0.4)
EOSINOPHIL NFR BLD AUTO: 0.5 %
ERYTHROCYTE [DISTWIDTH] IN BLOOD BY AUTOMATED COUNT: 12.9 % (ref 11.5–14.5)
GLUCOSE BLD MANUAL STRIP-MCNC: 108 MG/DL (ref 74–99)
GLUCOSE BLD MANUAL STRIP-MCNC: 146 MG/DL (ref 74–99)
GLUCOSE SERPL-MCNC: 192 MG/DL (ref 74–99)
HCT VFR BLD AUTO: 34.9 % (ref 41–52)
HGB BLD-MCNC: 11.5 G/DL (ref 13.5–17.5)
IMM GRANULOCYTES # BLD AUTO: 0.04 X10*3/UL (ref 0–0.5)
IMM GRANULOCYTES NFR BLD AUTO: 0.4 % (ref 0–0.9)
INR PPP: 1.6 (ref 0.9–1.1)
LACTATE SERPL-SCNC: 1.3 MMOL/L (ref 0.4–2)
LYMPHOCYTES # BLD AUTO: 1.31 X10*3/UL (ref 0.8–3)
LYMPHOCYTES NFR BLD AUTO: 12.9 %
MAGNESIUM SERPL-MCNC: 1.93 MG/DL (ref 1.6–2.4)
MCH RBC QN AUTO: 31.5 PG (ref 26–34)
MCHC RBC AUTO-ENTMCNC: 33 G/DL (ref 32–36)
MCV RBC AUTO: 96 FL (ref 80–100)
MONOCYTES # BLD AUTO: 0.9 X10*3/UL (ref 0.05–0.8)
MONOCYTES NFR BLD AUTO: 8.8 %
NEUTROPHILS # BLD AUTO: 7.84 X10*3/UL (ref 1.6–5.5)
NEUTROPHILS NFR BLD AUTO: 76.9 %
NRBC BLD-RTO: 0 /100 WBCS (ref 0–0)
PLATELET # BLD AUTO: 261 X10*3/UL (ref 150–450)
POTASSIUM SERPL-SCNC: 3.7 MMOL/L (ref 3.5–5.3)
PROT SERPL-MCNC: 7 G/DL (ref 6.4–8.2)
PROTHROMBIN TIME: 17.5 SECONDS (ref 9.8–12.4)
Q ONSET: 215 MS
QRS COUNT: 12 BEATS
QRS DURATION: 84 MS
QT INTERVAL: 370 MS
QTC CALCULATION(BAZETT): 410 MS
QTC FREDERICIA: 397 MS
R AXIS: -13 DEGREES
RBC # BLD AUTO: 3.65 X10*6/UL (ref 4.5–5.9)
SODIUM SERPL-SCNC: 136 MMOL/L (ref 136–145)
T AXIS: -1 DEGREES
T OFFSET: 400 MS
TSH SERPL-ACNC: 1.46 MIU/L (ref 0.44–3.98)
VENTRICULAR RATE: 74 BPM
WBC # BLD AUTO: 10.2 X10*3/UL (ref 4.4–11.3)

## 2025-05-28 PROCEDURE — 82947 ASSAY GLUCOSE BLOOD QUANT: CPT

## 2025-05-28 PROCEDURE — 85610 PROTHROMBIN TIME: CPT

## 2025-05-28 PROCEDURE — 83880 ASSAY OF NATRIURETIC PEPTIDE: CPT

## 2025-05-28 PROCEDURE — 99285 EMERGENCY DEPT VISIT HI MDM: CPT | Mod: 25 | Performed by: STUDENT IN AN ORGANIZED HEALTH CARE EDUCATION/TRAINING PROGRAM

## 2025-05-28 PROCEDURE — 96360 HYDRATION IV INFUSION INIT: CPT

## 2025-05-28 PROCEDURE — 99223 1ST HOSP IP/OBS HIGH 75: CPT | Performed by: REGISTERED NURSE

## 2025-05-28 PROCEDURE — 36415 COLL VENOUS BLD VENIPUNCTURE: CPT

## 2025-05-28 PROCEDURE — 93005 ELECTROCARDIOGRAM TRACING: CPT

## 2025-05-28 PROCEDURE — 85025 COMPLETE CBC W/AUTO DIFF WBC: CPT

## 2025-05-28 PROCEDURE — 2500000002 HC RX 250 W HCPCS SELF ADMINISTERED DRUGS (ALT 637 FOR MEDICARE OP, ALT 636 FOR OP/ED): Performed by: REGISTERED NURSE

## 2025-05-28 PROCEDURE — 71250 CT THORAX DX C-: CPT

## 2025-05-28 PROCEDURE — 83735 ASSAY OF MAGNESIUM: CPT

## 2025-05-28 PROCEDURE — G0378 HOSPITAL OBSERVATION PER HR: HCPCS

## 2025-05-28 PROCEDURE — 99285 EMERGENCY DEPT VISIT HI MDM: CPT | Mod: 25

## 2025-05-28 PROCEDURE — 70450 CT HEAD/BRAIN W/O DYE: CPT

## 2025-05-28 PROCEDURE — 70450 CT HEAD/BRAIN W/O DYE: CPT | Performed by: RADIOLOGY

## 2025-05-28 PROCEDURE — 84484 ASSAY OF TROPONIN QUANT: CPT

## 2025-05-28 PROCEDURE — 2500000001 HC RX 250 WO HCPCS SELF ADMINISTERED DRUGS (ALT 637 FOR MEDICARE OP): Performed by: REGISTERED NURSE

## 2025-05-28 PROCEDURE — 80053 COMPREHEN METABOLIC PANEL: CPT

## 2025-05-28 PROCEDURE — 2500000004 HC RX 250 GENERAL PHARMACY W/ HCPCS (ALT 636 FOR OP/ED)

## 2025-05-28 PROCEDURE — 72125 CT NECK SPINE W/O DYE: CPT

## 2025-05-28 PROCEDURE — 84443 ASSAY THYROID STIM HORMONE: CPT | Performed by: REGISTERED NURSE

## 2025-05-28 PROCEDURE — 83605 ASSAY OF LACTIC ACID: CPT

## 2025-05-28 PROCEDURE — 71250 CT THORAX DX C-: CPT | Performed by: RADIOLOGY

## 2025-05-28 PROCEDURE — 99222 1ST HOSP IP/OBS MODERATE 55: CPT

## 2025-05-28 PROCEDURE — 2500000001 HC RX 250 WO HCPCS SELF ADMINISTERED DRUGS (ALT 637 FOR MEDICARE OP)

## 2025-05-28 PROCEDURE — 72125 CT NECK SPINE W/O DYE: CPT | Performed by: RADIOLOGY

## 2025-05-28 RX ORDER — ONDANSETRON HYDROCHLORIDE 2 MG/ML
4 INJECTION, SOLUTION INTRAVENOUS EVERY 8 HOURS PRN
Status: DISCONTINUED | OUTPATIENT
Start: 2025-05-28 | End: 2025-05-29 | Stop reason: HOSPADM

## 2025-05-28 RX ORDER — DOCUSATE SODIUM 100 MG/1
100 CAPSULE, LIQUID FILLED ORAL 2 TIMES DAILY
Status: DISCONTINUED | OUTPATIENT
Start: 2025-05-28 | End: 2025-05-29 | Stop reason: HOSPADM

## 2025-05-28 RX ORDER — ONDANSETRON 4 MG/1
4 TABLET, FILM COATED ORAL EVERY 8 HOURS PRN
Status: DISCONTINUED | OUTPATIENT
Start: 2025-05-28 | End: 2025-05-29 | Stop reason: HOSPADM

## 2025-05-28 RX ORDER — DEXTROSE 50 % IN WATER (D50W) INTRAVENOUS SYRINGE
25
Status: DISCONTINUED | OUTPATIENT
Start: 2025-05-28 | End: 2025-05-29 | Stop reason: HOSPADM

## 2025-05-28 RX ORDER — TAMSULOSIN HYDROCHLORIDE 0.4 MG/1
0.4 CAPSULE ORAL DAILY
Status: DISCONTINUED | OUTPATIENT
Start: 2025-05-29 | End: 2025-05-29 | Stop reason: HOSPADM

## 2025-05-28 RX ORDER — POLYETHYLENE GLYCOL 3350 17 G/17G
17 POWDER, FOR SOLUTION ORAL DAILY
Status: DISCONTINUED | OUTPATIENT
Start: 2025-05-28 | End: 2025-05-29 | Stop reason: HOSPADM

## 2025-05-28 RX ORDER — HYDROXYCHLOROQUINE SULFATE 200 MG/1
200 TABLET, FILM COATED ORAL DAILY
Status: DISCONTINUED | OUTPATIENT
Start: 2025-05-29 | End: 2025-05-29 | Stop reason: HOSPADM

## 2025-05-28 RX ORDER — ACETAMINOPHEN 325 MG/1
650 TABLET ORAL EVERY 4 HOURS PRN
Status: DISCONTINUED | OUTPATIENT
Start: 2025-05-28 | End: 2025-05-29 | Stop reason: HOSPADM

## 2025-05-28 RX ORDER — ACETAMINOPHEN 160 MG/5ML
650 SOLUTION ORAL EVERY 4 HOURS PRN
Status: DISCONTINUED | OUTPATIENT
Start: 2025-05-28 | End: 2025-05-29 | Stop reason: HOSPADM

## 2025-05-28 RX ORDER — DEXTROSE 50 % IN WATER (D50W) INTRAVENOUS SYRINGE
12.5
Status: DISCONTINUED | OUTPATIENT
Start: 2025-05-28 | End: 2025-05-29 | Stop reason: HOSPADM

## 2025-05-28 RX ORDER — ACETAMINOPHEN 325 MG/1
650 TABLET ORAL ONCE
Status: COMPLETED | OUTPATIENT
Start: 2025-05-28 | End: 2025-05-28

## 2025-05-28 RX ORDER — INSULIN LISPRO 100 [IU]/ML
0-5 INJECTION, SOLUTION INTRAVENOUS; SUBCUTANEOUS
Status: DISCONTINUED | OUTPATIENT
Start: 2025-05-29 | End: 2025-05-29 | Stop reason: HOSPADM

## 2025-05-28 RX ORDER — TRAMADOL HYDROCHLORIDE 50 MG/1
50 TABLET, FILM COATED ORAL EVERY 8 HOURS PRN
Status: DISCONTINUED | OUTPATIENT
Start: 2025-05-28 | End: 2025-05-29

## 2025-05-28 RX ORDER — ACETAMINOPHEN 650 MG/1
650 SUPPOSITORY RECTAL EVERY 4 HOURS PRN
Status: DISCONTINUED | OUTPATIENT
Start: 2025-05-28 | End: 2025-05-29 | Stop reason: HOSPADM

## 2025-05-28 RX ORDER — OXYBUTYNIN CHLORIDE 5 MG/1
5 TABLET ORAL 4 TIMES DAILY
Status: DISCONTINUED | OUTPATIENT
Start: 2025-05-28 | End: 2025-05-29 | Stop reason: HOSPADM

## 2025-05-28 RX ORDER — DEXTROMETHORPHAN HYDROBROMIDE, GUAIFENESIN 5; 100 MG/5ML; MG/5ML
1300 LIQUID ORAL EVERY 8 HOURS PRN
COMMUNITY

## 2025-05-28 RX ORDER — METOPROLOL SUCCINATE 25 MG/1
25 TABLET, EXTENDED RELEASE ORAL DAILY
Status: DISCONTINUED | OUTPATIENT
Start: 2025-05-29 | End: 2025-05-29 | Stop reason: HOSPADM

## 2025-05-28 RX ORDER — NAPROXEN SODIUM 220 MG/1
81 TABLET, FILM COATED ORAL NIGHTLY
Status: DISCONTINUED | OUTPATIENT
Start: 2025-05-28 | End: 2025-05-29 | Stop reason: HOSPADM

## 2025-05-28 RX ORDER — FINASTERIDE 5 MG/1
5 TABLET, FILM COATED ORAL DAILY
Status: DISCONTINUED | OUTPATIENT
Start: 2025-05-29 | End: 2025-05-29 | Stop reason: HOSPADM

## 2025-05-28 RX ORDER — SIMVASTATIN 40 MG/1
40 TABLET, FILM COATED ORAL NIGHTLY
Status: DISCONTINUED | OUTPATIENT
Start: 2025-05-28 | End: 2025-05-29 | Stop reason: HOSPADM

## 2025-05-28 RX ADMIN — ACETAMINOPHEN 650 MG: 325 TABLET ORAL at 23:56

## 2025-05-28 RX ADMIN — ASPIRIN 81 MG: 81 TABLET, CHEWABLE ORAL at 21:10

## 2025-05-28 RX ADMIN — DOCUSATE SODIUM 100 MG: 100 CAPSULE, LIQUID FILLED ORAL at 21:10

## 2025-05-28 RX ADMIN — SODIUM CHLORIDE 500 ML: 0.9 INJECTION, SOLUTION INTRAVENOUS at 11:11

## 2025-05-28 RX ADMIN — ACETAMINOPHEN 650 MG: 325 TABLET ORAL at 16:10

## 2025-05-28 RX ADMIN — OXYBUTYNIN CHLORIDE 5 MG: 5 TABLET ORAL at 21:10

## 2025-05-28 RX ADMIN — SIMVASTATIN 40 MG: 40 TABLET, FILM COATED ORAL at 21:10

## 2025-05-28 SDOH — SOCIAL STABILITY: SOCIAL INSECURITY: WITHIN THE LAST YEAR, HAVE YOU BEEN HUMILIATED OR EMOTIONALLY ABUSED IN OTHER WAYS BY YOUR PARTNER OR EX-PARTNER?: NO

## 2025-05-28 SDOH — ECONOMIC STABILITY: INCOME INSECURITY: IN THE PAST 12 MONTHS HAS THE ELECTRIC, GAS, OIL, OR WATER COMPANY THREATENED TO SHUT OFF SERVICES IN YOUR HOME?: NO

## 2025-05-28 SDOH — ECONOMIC STABILITY: FOOD INSECURITY: WITHIN THE PAST 12 MONTHS, THE FOOD YOU BOUGHT JUST DIDN'T LAST AND YOU DIDN'T HAVE MONEY TO GET MORE.: NEVER TRUE

## 2025-05-28 SDOH — ECONOMIC STABILITY: FOOD INSECURITY: WITHIN THE PAST 12 MONTHS, YOU WORRIED THAT YOUR FOOD WOULD RUN OUT BEFORE YOU GOT THE MONEY TO BUY MORE.: NEVER TRUE

## 2025-05-28 SDOH — HEALTH STABILITY: MENTAL HEALTH: HOW OFTEN DO YOU HAVE A DRINK CONTAINING ALCOHOL?: MONTHLY OR LESS

## 2025-05-28 SDOH — SOCIAL STABILITY: SOCIAL INSECURITY: HAVE YOU HAD ANY THOUGHTS OF HARMING ANYONE ELSE?: NO

## 2025-05-28 SDOH — SOCIAL STABILITY: SOCIAL INSECURITY
WITHIN THE LAST YEAR, HAVE YOU BEEN KICKED, HIT, SLAPPED, OR OTHERWISE PHYSICALLY HURT BY YOUR PARTNER OR EX-PARTNER?: NO

## 2025-05-28 SDOH — SOCIAL STABILITY: SOCIAL INSECURITY
WITHIN THE LAST YEAR, HAVE YOU BEEN RAPED OR FORCED TO HAVE ANY KIND OF SEXUAL ACTIVITY BY YOUR PARTNER OR EX-PARTNER?: NO

## 2025-05-28 SDOH — HEALTH STABILITY: MENTAL HEALTH: HOW OFTEN DO YOU HAVE SIX OR MORE DRINKS ON ONE OCCASION?: NEVER

## 2025-05-28 SDOH — HEALTH STABILITY: MENTAL HEALTH: HOW MANY DRINKS CONTAINING ALCOHOL DO YOU HAVE ON A TYPICAL DAY WHEN YOU ARE DRINKING?: 1 OR 2

## 2025-05-28 SDOH — SOCIAL STABILITY: SOCIAL INSECURITY: DOES ANYONE TRY TO KEEP YOU FROM HAVING/CONTACTING OTHER FRIENDS OR DOING THINGS OUTSIDE YOUR HOME?: NO

## 2025-05-28 SDOH — SOCIAL STABILITY: SOCIAL INSECURITY: ARE THERE ANY APPARENT SIGNS OF INJURIES/BEHAVIORS THAT COULD BE RELATED TO ABUSE/NEGLECT?: NO

## 2025-05-28 SDOH — SOCIAL STABILITY: SOCIAL INSECURITY: WITHIN THE LAST YEAR, HAVE YOU BEEN AFRAID OF YOUR PARTNER OR EX-PARTNER?: NO

## 2025-05-28 SDOH — SOCIAL STABILITY: SOCIAL INSECURITY: DO YOU FEEL ANYONE HAS EXPLOITED OR TAKEN ADVANTAGE OF YOU FINANCIALLY OR OF YOUR PERSONAL PROPERTY?: NO

## 2025-05-28 SDOH — SOCIAL STABILITY: SOCIAL INSECURITY: HAS ANYONE EVER THREATENED TO HURT YOUR FAMILY OR YOUR PETS?: NO

## 2025-05-28 SDOH — SOCIAL STABILITY: SOCIAL INSECURITY: WERE YOU ABLE TO COMPLETE ALL THE BEHAVIORAL HEALTH SCREENINGS?: YES

## 2025-05-28 SDOH — SOCIAL STABILITY: SOCIAL INSECURITY: ARE YOU OR HAVE YOU BEEN THREATENED OR ABUSED PHYSICALLY, EMOTIONALLY, OR SEXUALLY BY ANYONE?: NO

## 2025-05-28 SDOH — SOCIAL STABILITY: SOCIAL INSECURITY: HAVE YOU HAD THOUGHTS OF HARMING ANYONE ELSE?: NO

## 2025-05-28 SDOH — SOCIAL STABILITY: SOCIAL INSECURITY: ABUSE: ADULT

## 2025-05-28 SDOH — SOCIAL STABILITY: SOCIAL INSECURITY: DO YOU FEEL UNSAFE GOING BACK TO THE PLACE WHERE YOU ARE LIVING?: NO

## 2025-05-28 ASSESSMENT — PAIN DESCRIPTION - DESCRIPTORS
DESCRIPTORS: ACHING

## 2025-05-28 ASSESSMENT — COGNITIVE AND FUNCTIONAL STATUS - GENERAL
DRESSING REGULAR LOWER BODY CLOTHING: A LOT
CLIMB 3 TO 5 STEPS WITH RAILING: A LOT
DRESSING REGULAR UPPER BODY CLOTHING: A LITTLE
WALKING IN HOSPITAL ROOM: A LITTLE
DAILY ACTIVITIY SCORE: 17
HELP NEEDED FOR BATHING: A LITTLE
MOVING TO AND FROM BED TO CHAIR: A LITTLE
PERSONAL GROOMING: A LITTLE
MOBILITY SCORE: 19
EATING MEALS: A LITTLE
PATIENT BASELINE BEDBOUND: NO
TOILETING: A LITTLE
STANDING UP FROM CHAIR USING ARMS: A LITTLE

## 2025-05-28 ASSESSMENT — ACTIVITIES OF DAILY LIVING (ADL)
ASSISTIVE_DEVICE: EYEGLASSES
PATIENT'S MEMORY ADEQUATE TO SAFELY COMPLETE DAILY ACTIVITIES?: YES
HEARING - LEFT EAR: HEARING AID
TOILETING: INDEPENDENT
ADEQUATE_TO_COMPLETE_ADL: YES
FEEDING YOURSELF: INDEPENDENT
HEARING - RIGHT EAR: HEARING AID
JUDGMENT_ADEQUATE_SAFELY_COMPLETE_DAILY_ACTIVITIES: YES
WALKS IN HOME: INDEPENDENT
DRESSING YOURSELF: INDEPENDENT
BATHING: INDEPENDENT
LACK_OF_TRANSPORTATION: NO
GROOMING: INDEPENDENT

## 2025-05-28 ASSESSMENT — ENCOUNTER SYMPTOMS
LIGHT-HEADEDNESS: 1
GASTROINTESTINAL NEGATIVE: 1
EYES NEGATIVE: 1
CONSTITUTIONAL NEGATIVE: 1
CARDIOVASCULAR NEGATIVE: 1
RESPIRATORY NEGATIVE: 1

## 2025-05-28 ASSESSMENT — LIFESTYLE VARIABLES
SKIP TO QUESTIONS 9-10: 1
HAVE PEOPLE ANNOYED YOU BY CRITICIZING YOUR DRINKING: NO
TOTAL SCORE: 0
AUDIT-C TOTAL SCORE: 1
HAVE YOU EVER FELT YOU SHOULD CUT DOWN ON YOUR DRINKING: NO
EVER FELT BAD OR GUILTY ABOUT YOUR DRINKING: NO
EVER HAD A DRINK FIRST THING IN THE MORNING TO STEADY YOUR NERVES TO GET RID OF A HANGOVER: NO

## 2025-05-28 ASSESSMENT — COLUMBIA-SUICIDE SEVERITY RATING SCALE - C-SSRS
2. HAVE YOU ACTUALLY HAD ANY THOUGHTS OF KILLING YOURSELF?: NO
6. HAVE YOU EVER DONE ANYTHING, STARTED TO DO ANYTHING, OR PREPARED TO DO ANYTHING TO END YOUR LIFE?: NO
1. IN THE PAST MONTH, HAVE YOU WISHED YOU WERE DEAD OR WISHED YOU COULD GO TO SLEEP AND NOT WAKE UP?: NO

## 2025-05-28 ASSESSMENT — PAIN SCALES - GENERAL
PAINLEVEL_OUTOF10: 3
PAINLEVEL_OUTOF10: 2
PAINLEVEL_OUTOF10: 6
PAINLEVEL_OUTOF10: 7
PAINLEVEL_OUTOF10: 0 - NO PAIN

## 2025-05-28 ASSESSMENT — PAIN - FUNCTIONAL ASSESSMENT
PAIN_FUNCTIONAL_ASSESSMENT: 0-10

## 2025-05-28 ASSESSMENT — PATIENT HEALTH QUESTIONNAIRE - PHQ9
1. LITTLE INTEREST OR PLEASURE IN DOING THINGS: NOT AT ALL
SUM OF ALL RESPONSES TO PHQ9 QUESTIONS 1 & 2: 0
2. FEELING DOWN, DEPRESSED OR HOPELESS: NOT AT ALL

## 2025-05-28 NOTE — TELEPHONE ENCOUNTER
Patient's wife lm   Syncopal episode last night. Hit head. Had some bleeding   Rib pain/right sided.   Requesting xrays or an appointment today   Did not call EMS or go to ED.   He is on a blood thinner.

## 2025-05-28 NOTE — ED PROVIDER NOTES
"HPI   Chief Complaint   Patient presents with    Syncope     \"I passed out last night.  Passed out and Hit head and right side. Is on Eliquis.\"    Trauma       History provided by: Patient    Limitations to history: None    CC: Syncopal episode, HIA    HPI: 84-year-old male with a history of rheumatoid arthritis, A-fib, type II DM, hypertension, hyperlipidemia, peripheral venous disease presents the emergency department to be evaluated for a syncopal episode and HIA.  Patient states that late last night he went to get a glass of water when the next thing he knew he woke up on the ground.  He denies history of syncopal episodes.  Denies lightheadedness or dizziness before or after the episode.  He reported a mild headache this morning, this is since gone away with Tylenol.  He does take Eliquis due to his history of A-fib.  Denies history of intracranial hemorrhage.  Denies neck pain.  Denies vision changes.  Denies numbness tingling or weakness in the extremities.  He does report pain in his right anterior ribs from the fall.  Denies chest pain or shortness of breath.  Denies history of ACS, heart failure, DVT or PE, asthma.  Denies pleuritic pain and hemoptysis.  Denies all GI and  complaints.  He is able to ambulate without difficulty.  Denies slurred speech or facial drooping.  Denies history of TIA or stroke.  Denies weakness and fatigue.  He also reports an abrasion over the back of his head, bleeding was mild originally but now controlled.  Denies all other systemic symptoms.    ROS: Negative unless mentioned in HPI    Medical Hx: Denies allergies.  Immunizations including tetanus are up-to-date.    Physical exam:    Constitutional: Sitting comfortably in the room and in no distress.  Oriented to person, place, time, and situation.    HEENT: Head is normocephalic, abrasion over the posterior scalp.  No active bleeding.  Patient's airway is patent.  Tympanic membranes are clear bilaterally.  Nasal mucosa " clear.  Mouth with normal mucosa.  Throat is not erythematous and there are no oropharyngeal exudates, uvula is midline.  No obvious facial deformities.  No muscular or midline tenderness in the cervical region.  Patient has full range of motion in his neck.    Eyes: Clear bilaterally.  Pupils are equal round and reactive to light and accommodation.  Extraocular movements intact.      Cardiac: Regular rate, regular rhythm.  Heart sounds S1, S2.  No murmurs, rubs, or gallops.  PMI nondisplaced.  No JVD.    Respiratory: Regular respiratory rate and effort.  Breath sounds are clear and equal bilaterally, no adventitious lung sounds.  Patient is speaking in full sentences and is in no apparent respiratory distress. No use of accessory muscles.      Gastrointestinal: Abdomen is soft, nondistended, and nontender.  There are no obvious deformities.  No rebound tenderness or guarding.  Bowel sounds are normal active.    Genitourinary: No CVA or flank tenderness.    Musculoskeletal:  reproducible tenderness in the right anterior inferior ribs. No obvious skin or bony deformities.  Patient has equal range of motion in all extremities and no strength deficiencies.  Capillary refill less than 3 seconds.  Strong peripheral pulses.  No sensory deficits.    Neurological: Patient is alert and oriented.  No focal deficits.  5/5 strength in all extremities.  Cranial nerves II through XII intact. GCS15.  NIH is 0.    Skin: Skin is normal color for race and is warm, dry, and intact.  No evidence of trauma.  No lesions, rashes, bruising, jaundice, or masses.    Psych: Appropriate mood and affect.  No apparent risk to self or others.    Heme/lymph: No adenopathy, lymphadenopathy, or splenomegaly    Physical exam is otherwise negative unless stated above or in history of present illness.              Patient History   Medical History[1]  Surgical History[2]  Family History[3]  Social History[4]    Physical Exam   ED Triage Vitals  [05/28/25 1020]   Temperature Heart Rate Respirations BP   36.2 °C (97.2 °F) 77 18 149/70      Pulse Ox Temp Source Heart Rate Source Patient Position   99 % Temporal Monitor Sitting      BP Location FiO2 (%)     Right arm --       Physical Exam      ED Course & [facility]   Diagnoses as of 05/28/25 1356   Syncope and collapse   Closed fracture of multiple ribs of right side, initial encounter   Patient updated on plan for lab testing, IV insertion, radiology imaging, and medications to be administered while in the ER (if indicated). Patient updated on expected wait times for testing and results. Patient provided my name and told to ask any staff member for questions or concerns if they should arise. Electronic medical record reviewed.     [facility]    Patient presented to the emergency department with the chief complaint syncopal episode. Head is normocephalic, abrasion over the posterior scalp.  No active bleeding.  Patient's airway is patent.  Tympanic membranes are clear bilaterally.  Nasal mucosa clear.  Mouth with normal mucosa.  Throat is not erythematous and there are no oropharyngeal exudates, uvula is midline.  No obvious facial deformities.  No muscular or midline tenderness in the cervical region.  Patient has full range of motion in his neck.  No neurological deficits.  NIH is 0. Head is normocephalic, abrasion over the posterior scalp.  No active bleeding.  Patient's airway is patent.  Tympanic membranes are clear bilaterally.  Nasal mucosa clear.  Mouth with normal mucosa.  Throat is not erythematous and there are no oropharyngeal exudates, uvula is midline.  No obvious facial deformities.  No muscular or midline tenderness in the cervical region.  Patient has full range of motion in his neck. On arrival to the emergency department, vital signs were within normal limits    Will obtain a CT of the head, C-spine, chest.  Will obtain basic blood work, EKG and troponin, magnesium.  Will give the patient IV normal  saline.  Low suspicion for dissection or PE.  At this time patient does not have any obvious findings that would suggest a stroke or seizure.    EKG performed at 1129 and interpreted by me.  A-fib rate controlled 74 beats minute.  Left axis deviation.  No ST elevation or depression.  No prolonged QT.    Troponin is 5.  CMP reveals GFR 50.  BUN is 136.  Lactate is 1.3.  CBC reveals normocytic anemia.  Magnesium is 1.93.  CT of the head and C-spine revealed no traumatic injury.  CT of the chest reveals right 7th through 9th rib fracture without hemothorax or pneumothorax.  I did confirm with general surgery. Dr. Baer, that this only requires supportive treatment and does not require observation.    I spoke to the hospitalist nurse practitioner who is agreeable to admit the patient for syncopal episode.  Patient remained hemodynamically stable in the ER.    This note was dictated using a speech recognition program.  While an attempt was made at proof-reading to minimize errors, minor errors in transcription may be present              No data recorded                                 Medical Decision Making      Procedure  Procedures         [1]   Past Medical History:  Diagnosis Date    Benign prostatic hyperplasia with lower urinary tract symptoms     Enlarged prostate with lower urinary tract symptoms (LUTS)    Encounter for general adult medical examination without abnormal findings     Health care maintenance    Other bursitis of hip, unspecified hip     Bursitis of hip    Other conditions influencing health status     History of cough    Other fecal abnormalities 05/14/2021    Occult blood in stools    Other specified abnormal findings of blood chemistry     Creatinine elevation    Pain in left leg     Pain of left lower extremity    Personal history of other diseases of the circulatory system     History of coronary artery disease    Personal history of other diseases of the circulatory system     History of  abnormal electrocardiography    Personal history of other diseases of the circulatory system 07/29/2019    History of hypertension    Personal history of other diseases of the digestive system     History of constipation    Personal history of other diseases of the musculoskeletal system and connective tissue     History of spinal stenosis    Personal history of other diseases of the musculoskeletal system and connective tissue     History of rheumatoid arthritis    Personal history of other diseases of the musculoskeletal system and connective tissue     History of backache    Personal history of other diseases of the musculoskeletal system and connective tissue     History of muscle spasm    Personal history of other diseases of the nervous system and sense organs     History of tinnitus    Personal history of other diseases of the nervous system and sense organs     History of hearing loss    Personal history of other diseases of the respiratory system     History of acute bronchitis    Personal history of other endocrine, nutritional and metabolic disease     History of hyperlipidemia    Personal history of other endocrine, nutritional and metabolic disease 09/01/2020    History of obesity    Personal history of other endocrine, nutritional and metabolic disease 03/27/2020    History of diabetes mellitus    Personal history of other infectious and parasitic diseases     History of candidiasis    Personal history of other specified conditions     History of nasal congestion    Stress incontinence (female) (male)     Male stress incontinence   [2]   Past Surgical History:  Procedure Laterality Date    OTHER SURGICAL HISTORY  03/11/2022    Cyst excision    OTHER SURGICAL HISTORY  03/11/2022    Cardiac catheterization    OTHER SURGICAL HISTORY  03/11/2022    Tooth extraction    OTHER SURGICAL HISTORY  06/02/2021    Neck surgery    OTHER SURGICAL HISTORY  06/02/2021    Colonoscopy    OTHER SURGICAL HISTORY   06/02/2021    Cataract surgery   [3]   Family History  Problem Relation Name Age of Onset    Heart disease Mother      Arthritis Mother     [4]   Social History  Tobacco Use    Smoking status: Never    Smokeless tobacco: Never   Vaping Use    Vaping status: Never Used   Substance Use Topics    Alcohol use: Yes     Comment: 2x a week    Drug use: Not Currently        Johny Bowling PA-C  05/28/25 [id number]

## 2025-05-28 NOTE — PROGRESS NOTES
Pt admitted from home, lives w/ spouse. PLOF ambulatory. Pt became lightheaded and fell striking his head and has Right 7th through 9th rib fractures with no pleural effusion or pneumothorax. Requested pt/ot order for am.

## 2025-05-28 NOTE — TELEPHONE ENCOUNTER
Spoke w/spouse. Recommended ED eval d/t syncopal episode, bumping head/anti-coagulant use.  Spouse voiced understanding.  Pt will go to ED for eval today.

## 2025-05-28 NOTE — H&P
"[facility]  TRAUMA SERVICE - HISTORY AND PHYSICAL / CONSULT    Patient Name: Artis Horowitz  MRN: [id number]  Admit Date: 528  : 1940  AGE: 84 y.o.   GENDER: male  ==============================================================================  MECHANISM OF INJURY / CHIEF COMPLAINT:   Patient is a 84-year-old male with a past medical history of diabetes mellitus, atrial fibrillation on Eliquis, hypertension, CAD, rheumatoid arthritis, and anemia of chronic disease presented to [facility] emergency department after a fall yesterday.    Patient is accompanied at bedside by wife and daughter.  He reports that he was in his kitchen yesterday getting a drink when he felt lightheaded and fell backwards, either hitting his head on the back of a chair on a table. This has never happened before. Patient reports the family initially just cleaned up the blood and able to control bleeding from his scalp but after noting he could not sleep well and continued chest pain he came in this morning.    Currently admits to right-sided chest wall pain.  Patient also notes his left leg will not tingle but have a \"different feeling\".  Denies chest pain, shortness of breath, weakness, nausea, abdominal pain, dysuria, changes in bowel habits, weakness, or visual changes.    LOC (yes/no?): Yes  Anticoagulant / Anti-platelet Rx? (for what dx?): Eliquis, A-fib  Referring Facility Name (N/A for scene EMR run): N/A    INJURIES:   Right 7th through 9th rib fractures with no pleural effusion or pneumothorax    OTHER MEDICAL PROBLEMS:  T2DM, CAD, atrial fibrillation on Eliquis, hypertension, rheumatoid arthritis, anemia of chronic disease    INCIDENTAL FINDINGS:  Scarring and/or atelectasis at the lung bases bilaterally    ==============================================================================  ADMISSION PLAN OF CARE:  Patient will be admitted under hospital medicine service for further evaluation of " syncopal episode and management.  Trauma consulted for management of rib fracture    ==============================================================================  PAST MEDICAL HISTORY:   PMH:   Medical History[1]    PSH:   Surgical History[2]  FH:   Family History[3]  SOCIAL HISTORY:    Smoking:  Tobacco Use History[4]    Alcohol: Occasionally  Social History     Substance and Sexual Activity   Alcohol Use Yes    Comment: 2x a week       Drug use:     MEDICATIONS:   Prior to Admission medications    Medication Sig Start Date End Date Taking? Authorizing Provider   Accu-Chek Beulah Plus test strp strip USE ONE STRIP TO CHECK GLUCOSE ONCE DAILY 2/6/23   Historical Provider, MD   acetaminophen (Tylenol) 325 mg tablet Take by mouth every 6 hours if needed.    Historical Provider, MD   aspirin 81 mg chewable tablet Chew once daily.    Historical Provider, MD   docusate sodium (Colace) 100 mg capsule Take 1 capsule (100 mg) by mouth 2 times a day as needed.    Historical Provider, MD   Eliquis 5 mg tablet TAKE 1 TABLET BY MOUTH TWICE DAILY 12/2/24   Nate Arnett MD   finasteride (Proscar) 5 mg tablet Take 1 tablet (5 mg) by mouth once daily. 5/14/25   Nicolette Galeana DO   halobetasol (UltraVATE) 0.05 % cream to affected area 4/13/23   Historical Provider, MD   hydroxychloroquine (Plaquenil) 200 mg tablet Take by mouth once daily.    Historical Provider, MD   losartan-hydrochlorothiazide (Hyzaar) 100-25 mg tablet TAKE 1 TABLET BY MOUTH DAILY 4/3/25   Nate Arnett MD   metFORMIN  mg 24 hr tablet Take 1 tablet (500 mg) by mouth once daily. 3/3/25   Nicolette Galeana DO   metoprolol succinate XL (Toprol-XL) 25 mg 24 hr tablet TAKE 1 TABLET BY MOUTH ONCE DAILY 12/2/24   Nicolette Galeana DO   simvastatin (Zocor) 40 mg tablet Take 1 tablet (40 mg) by mouth once daily at bedtime. 1/31/25 1/31/26  Nicolette Galeana DO   tamsulosin (Flomax) 0.4 mg 24 hr capsule Take 1 capsule (0.4 mg) by mouth once daily. 4/15/25  4/15/26  Nicolette Glaeana,    trospium (Sanctura XR) 60 mg 24 hour capsule Take 1 capsule (60 mg) by mouth once daily. 8/26/24   Demetrio Khan MD   Unilet Lancet 33 gauge misc USE AS DIRECTED to check blood sugar ONCE DAILY 4/24/23   Historical Provider, MD     ALLERGIES: NKDA  RX Allergies[5]    REVIEW OF SYSTEMS:  Review of Systems   Constitutional: Negative.    HENT: Negative.     Eyes: Negative.    Respiratory: Negative.     Cardiovascular: Negative.    Gastrointestinal: Negative.    Genitourinary: Negative.    Musculoskeletal:         + Right chest wall pain   Skin: Negative.    Neurological:  Positive for light-headedness.     PHYSICAL EXAM:  PRIMARY SURVEY:  Primary Survey  Please see ED provider note for primary survey physical exam    SECONDARY SURVEY/PHYSICAL EXAM:  Physical Exam  Constitutional:       Appearance: Normal appearance. He is overweight.   HENT:      Head:        Comments: ~ 1 cm skin tear.     Mouth/Throat:      Mouth: Mucous membranes are moist.   Eyes:      General: No scleral icterus.     Extraocular Movements: Extraocular movements intact.   Cardiovascular:      Rate and Rhythm: Normal rate. Rhythm irregular.      Pulses: Normal pulses.   Pulmonary:      Effort: Pulmonary effort is normal. No respiratory distress.      Breath sounds: No wheezing.   Abdominal:      General: Bowel sounds are normal. There is no distension.      Palpations: Abdomen is soft.      Tenderness: There is no abdominal tenderness.   Musculoskeletal:         General: No swelling or tenderness.      Cervical back: No tenderness.   Skin:     General: Skin is warm and dry.      Capillary Refill: Capillary refill takes less than 2 seconds.   Neurological:      General: No focal deficit present.      Mental Status: He is alert. Mental status is at baseline.   Psychiatric:         Mood and Affect: Mood normal.         Behavior: Behavior is cooperative.       IMAGING SUMMARY:  (summary of findings, not a copy of  dictation)  CT Head/Face: No acute infarct or intracranial hemorrhage  CT C-Spine: No CT evidence of acute fracture  CT Chest/Abd/Pelvis: Mildly displaced right 7th, 8th, and 9th ribs no pleural effusion no pneumothorax  CXR/PXR: N/A  Other(s): N/A    LABS:  Results for orders placed or performed during the hospital encounter of 05/28/25 (from the past 24 hours)   CBC and Auto Differential   Result Value Ref Range    WBC 10.2 4.4 - 11.3 x10*3/uL    nRBC 0.0 0.0 - 0.0 /100 WBCs    RBC 3.65 (L) 4.50 - 5.90 x10*6/uL    Hemoglobin 11.5 (L) 13.5 - 17.5 g/dL    Hematocrit 34.9 (L) 41.0 - 52.0 %    MCV 96 80 - 100 fL    MCH 31.5 26.0 - 34.0 pg    MCHC 33.0 32.0 - 36.0 g/dL    RDW 12.9 11.5 - 14.5 %    Platelets 261 150 - 450 x10*3/uL    Neutrophils % 76.9 40.0 - 80.0 %    Immature Granulocytes %, Automated 0.4 0.0 - 0.9 %    Lymphocytes % 12.9 13.0 - 44.0 %    Monocytes % 8.8 2.0 - 10.0 %    Eosinophils % 0.5 0.0 - 6.0 %    Basophils % 0.5 0.0 - 2.0 %    Neutrophils Absolute 7.84 (H) 1.60 - 5.50 x10*3/uL    Immature Granulocytes Absolute, Automated 0.04 0.00 - 0.50 x10*3/uL    Lymphocytes Absolute 1.31 0.80 - 3.00 x10*3/uL    Monocytes Absolute 0.90 (H) 0.05 - 0.80 x10*3/uL    Eosinophils Absolute 0.05 0.00 - 0.40 x10*3/uL    Basophils Absolute 0.05 0.00 - 0.10 x10*3/uL   Comprehensive metabolic panel   Result Value Ref Range    Glucose 192 (H) 74 - 99 mg/dL    Sodium 136 136 - 145 mmol/L    Potassium 3.7 3.5 - 5.3 mmol/L    Chloride 100 98 - 107 mmol/L    Bicarbonate 29 21 - 32 mmol/L    Anion Gap 11 10 - 20 mmol/L    Urea Nitrogen 29 (H) 6 - 23 mg/dL    Creatinine 1.20 0.50 - 1.30 mg/dL    eGFR 60 (L) >60 mL/min/1.73m*2    Calcium 9.4 8.6 - 10.3 mg/dL    Albumin 4.2 3.4 - 5.0 g/dL    Alkaline Phosphatase 85 33 - 136 U/L    Total Protein 7.0 6.4 - 8.2 g/dL    AST 15 9 - 39 U/L    Bilirubin, Total 0.6 0.0 - 1.2 mg/dL    ALT 11 10 - 52 U/L   Magnesium   Result Value Ref Range    Magnesium 1.93 1.60 - 2.40 mg/dL   B-Type  Natriuretic Peptide   Result Value Ref Range     (H) 0 - 99 pg/mL   Troponin I, High Sensitivity, Initial   Result Value Ref Range    Troponin I, High Sensitivity 5 0 - 20 ng/L   Lactate   Result Value Ref Range    Lactate 1.3 0.4 - 2.0 mmol/L   Coagulation Screen   Result Value Ref Range    Protime 17.5 (H) 9.8 - 12.4 seconds    INR 1.6 (H) 0.9 - 1.1    aPTT 37 (H) 26 - 36 seconds   ECG 12 lead   Result Value Ref Range    Ventricular Rate 74 BPM    Atrial Rate 74 BPM    QRS Duration 84 ms    QT Interval 370 ms    QTC Calculation(Bazett) 410 ms    R Axis -13 degrees    T Axis -1 degrees    QRS Count 12 beats    Q Onset 215 ms    T Offset 400 ms    QTC Fredericia 397 ms       I have reviewed all laboratory and imaging results ordered/pertinent for this encounter.      Julienne Menon PA-C        Time spent  60  minutes obtaining labs, imaging, recommendations, interview, assessment, examination, medication review/ordering, and EMR review.    Plan of care was discussed extensively with patient. Patient verbalized understanding through teach back method. All questions and concerns addressed upon examination.     Of note, this documentation is completed using the Dragon Dictation system (voice recognition software). There may be spelling and/or grammatical errors that were not corrected prior to final submission.           [1]   Past Medical History:  Diagnosis Date    Benign prostatic hyperplasia with lower urinary tract symptoms     Enlarged prostate with lower urinary tract symptoms (LUTS)    Encounter for general adult medical examination without abnormal findings     Health care maintenance    Other bursitis of hip, unspecified hip     Bursitis of hip    Other conditions influencing health status     History of cough    Other fecal abnormalities 05/14/2021    Occult blood in stools    Other specified abnormal findings of blood chemistry     Creatinine elevation    Pain in left leg     Pain of left lower  extremity    Personal history of other diseases of the circulatory system     History of coronary artery disease    Personal history of other diseases of the circulatory system     History of abnormal electrocardiography    Personal history of other diseases of the circulatory system 07/29/2019    History of hypertension    Personal history of other diseases of the digestive system     History of constipation    Personal history of other diseases of the musculoskeletal system and connective tissue     History of spinal stenosis    Personal history of other diseases of the musculoskeletal system and connective tissue     History of rheumatoid arthritis    Personal history of other diseases of the musculoskeletal system and connective tissue     History of backache    Personal history of other diseases of the musculoskeletal system and connective tissue     History of muscle spasm    Personal history of other diseases of the nervous system and sense organs     History of tinnitus    Personal history of other diseases of the nervous system and sense organs     History of hearing loss    Personal history of other diseases of the respiratory system     History of acute bronchitis    Personal history of other endocrine, nutritional and metabolic disease     History of hyperlipidemia    Personal history of other endocrine, nutritional and metabolic disease 09/01/2020    History of obesity    Personal history of other endocrine, nutritional and metabolic disease 03/27/2020    History of diabetes mellitus    Personal history of other infectious and parasitic diseases     History of candidiasis    Personal history of other specified conditions     History of nasal congestion    Stress incontinence (female) (male)     Male stress incontinence   [2]   Past Surgical History:  Procedure Laterality Date    OTHER SURGICAL HISTORY  03/11/2022    Cyst excision    OTHER SURGICAL HISTORY  03/11/2022    Cardiac catheterization    OTHER  SURGICAL HISTORY  03/11/2022    Tooth extraction    OTHER SURGICAL HISTORY  06/02/2021    Neck surgery    OTHER SURGICAL HISTORY  06/02/2021    Colonoscopy    OTHER SURGICAL HISTORY  06/02/2021    Cataract surgery   [3]   Family History  Problem Relation Name Age of Onset    Heart disease Mother      Arthritis Mother     [4]   Social History  Tobacco Use   Smoking Status Never   Smokeless Tobacco Never   [5] No Known Allergies

## 2025-05-28 NOTE — CARE PLAN
The clinical goals for the shift include pain control    Problem: Pain - Adult  Goal: Verbalizes/displays adequate comfort level or baseline comfort level  Outcome: Progressing     Problem: Safety - Adult  Goal: Free from fall injury  Outcome: Progressing     Problem: Discharge Planning  Goal: Discharge to home or other facility with appropriate resources  Outcome: Progressing     Problem: Chronic Conditions and Co-morbidities  Goal: Patient's chronic conditions and co-morbidity symptoms are monitored and maintained or improved  Outcome: Progressing     Problem: Nutrition  Goal: Nutrient intake appropriate for maintaining nutritional needs  Outcome: Progressing     Problem: Diabetes  Goal: Achieve decreasing blood glucose levels by end of shift  Outcome: Progressing  Goal: Increase stability of blood glucose readings by end of shift  Outcome: Progressing  Goal: Decrease in ketones present in urine by end of shift  Outcome: Progressing  Goal: Maintain electrolyte levels within acceptable range throughout shift  Outcome: Progressing  Goal: Maintain glucose levels >70mg/dl to <250mg/dl throughout shift  Outcome: Progressing  Goal: No changes in neurological exam by end of shift  Outcome: Progressing  Goal: Learn about and adhere to nutrition recommendations by end of shift  Outcome: Progressing  Goal: Vital signs within normal range for age by end of shift  Outcome: Progressing  Goal: Increase self care and/or family involovement by end of shift  Outcome: Progressing  Goal: Receive DSME education by end of shift  Outcome: Progressing     Problem: Pain  Goal: Takes deep breaths with improved pain control throughout the shift  Outcome: Progressing  Goal: Turns in bed with improved pain control throughout the shift  Outcome: Progressing  Goal: Walks with improved pain control throughout the shift  Outcome: Progressing  Goal: Performs ADL's with improved pain control throughout shift  Outcome: Progressing  Goal:  Participates in PT with improved pain control throughout the shift  Outcome: Progressing  Goal: Free from opioid side effects throughout the shift  Outcome: Progressing  Goal: Free from acute confusion related to pain meds throughout the shift  Outcome: Progressing     Problem: Fall/Injury  Goal: Not fall by end of shift  Outcome: Progressing  Goal: Be free from injury by end of the shift  Outcome: Progressing  Goal: Verbalize understanding of personal risk factors for fall in the hospital  Outcome: Progressing  Goal: Verbalize understanding of risk factor reduction measures to prevent injury from fall in the home  Outcome: Progressing  Goal: Use assistive devices by end of the shift  Outcome: Progressing  Goal: Pace activities to prevent fatigue by end of the shift  Outcome: Progressing

## 2025-05-28 NOTE — H&P
"History Of Present Illness  Artis Horowitz is a 84 y.o. male PMHx Diabetes, HTN,  Afib on eliquis, HLD presents  for syncopal and collapse episode.  Patient reports he was out late last night with due to glass of water and the next immediately walked up on the ground.  No precipitating factors such as chest pain, shortness of breath, palpitations headache or blurry vision.  He denies any lightheadedness or dizziness after episode.  He did have a mild headache but was treated with Tylenol.  He is anticoagulated with Eliquis for A-fib.  He believes he hit the table on his fall and that may have what caused his rib fractures. He was found by his wife, as he awakened when he hit the floor and she states he was not there long a \"few minutes\" at most. This has never happened prior. He did take his eliquis this morning. Family stated they were able to get his head to stop bleeding. On exam, on neurological deficits, he is intact. He does complain of a mild headache that has not worsened, no confusion or unilateral weakness. He states he had no confusion after fall. No blurry vision or fatigue noted. Occupit area noted for laceration with scant drainage noted. Tender with palpation. Right thoracic wall with tenderness due to fractured ribs. No creptitis appreciated     ER course: Hemodynamically stable, room air, glucose 192, potassium 3.7, creatinine 1.2, EGFR 60, , troponin 506, INR 1.6, WBCs 10.2, right-sided rib fractures no pleural effusion or pneumothorax identified, CT head without infarct or intracranial hemorrhage, no CT evidence of acute fracture C-spine.    Review of systems: 10 system were reviewed and were negative except what was mentioned in history of present illness      Past Medical History  Medical History[1]    Surgical History  Surgical History[2]     Social History  He reports that he has never smoked. He has never used smokeless tobacco. He reports current alcohol use. He reports that he does " "not currently use drugs.    Family History  Family History[3]     Allergies  Patient has no known allergies.      Physical Exam   Constitutional: Well developed, awake/alert/oriented x3, , cooperative  Eyes: PERRL, EOMI, clear sclera  ENMT: mucous membranes moist, no apparent injury, no lesions seen, tender with palpation to occiput area, scant drainage   Head/Neck: Neck supple, no apparent injury, thyroid without mass or tenderness  Respiratory/Thorax: Patent airways,  normal breath sounds with good  Cardiovascular: irregular, rate and rhythm,  2+ equal pulses of the extremities, normal S 1and S 2, mild edema in bilateral lower extremities   Gastrointestinal: Nondistended, soft, non-tender,  +BS x 4 quadrants  Genitourinary: voiding freely without CVA tenderness or suprabupic tenderness   Musculoskeletal: ROM intact, no joint swelling, thoracic chest wall tenderness due to rib fx with palpation   Extremities: normal extremities,  no contusions or wounds seen   Skin: warm, dry, intact  Neurological: alert/oriented x 3, speech clear, no neurological deficits identified   Psychiatric: appropriate mood and behavior    Last Recorded Vitals  Blood pressure 140/84, pulse 76, temperature 36.8 °C (98.2 °F), resp. rate 17, height 1.727 m (5' 8\"), weight 76.2 kg (168 lb), SpO2 97%.    Relevant Results  Scheduled medications  Scheduled Medications[4]  Continuous medications  Continuous Medications[5]  PRN medications  PRN Medications[6]    Results for orders placed or performed during the hospital encounter of 05/28/25 (from the past 24 hours)   CBC and Auto Differential   Result Value Ref Range    WBC 10.2 4.4 - 11.3 x10*3/uL    nRBC 0.0 0.0 - 0.0 /100 WBCs    RBC 3.65 (L) 4.50 - 5.90 x10*6/uL    Hemoglobin 11.5 (L) 13.5 - 17.5 g/dL    Hematocrit 34.9 (L) 41.0 - 52.0 %    MCV 96 80 - 100 fL    MCH 31.5 26.0 - 34.0 pg    MCHC 33.0 32.0 - 36.0 g/dL    RDW 12.9 11.5 - 14.5 %    Platelets 261 150 - 450 x10*3/uL    Neutrophils % " 76.9 40.0 - 80.0 %    Immature Granulocytes %, Automated 0.4 0.0 - 0.9 %    Lymphocytes % 12.9 13.0 - 44.0 %    Monocytes % 8.8 2.0 - 10.0 %    Eosinophils % 0.5 0.0 - 6.0 %    Basophils % 0.5 0.0 - 2.0 %    Neutrophils Absolute 7.84 (H) 1.60 - 5.50 x10*3/uL    Immature Granulocytes Absolute, Automated 0.04 0.00 - 0.50 x10*3/uL    Lymphocytes Absolute 1.31 0.80 - 3.00 x10*3/uL    Monocytes Absolute 0.90 (H) 0.05 - 0.80 x10*3/uL    Eosinophils Absolute 0.05 0.00 - 0.40 x10*3/uL    Basophils Absolute 0.05 0.00 - 0.10 x10*3/uL   Comprehensive metabolic panel   Result Value Ref Range    Glucose 192 (H) 74 - 99 mg/dL    Sodium 136 136 - 145 mmol/L    Potassium 3.7 3.5 - 5.3 mmol/L    Chloride 100 98 - 107 mmol/L    Bicarbonate 29 21 - 32 mmol/L    Anion Gap 11 10 - 20 mmol/L    Urea Nitrogen 29 (H) 6 - 23 mg/dL    Creatinine 1.20 0.50 - 1.30 mg/dL    eGFR 60 (L) >60 mL/min/1.73m*2    Calcium 9.4 8.6 - 10.3 mg/dL    Albumin 4.2 3.4 - 5.0 g/dL    Alkaline Phosphatase 85 33 - 136 U/L    Total Protein 7.0 6.4 - 8.2 g/dL    AST 15 9 - 39 U/L    Bilirubin, Total 0.6 0.0 - 1.2 mg/dL    ALT 11 10 - 52 U/L   Magnesium   Result Value Ref Range    Magnesium 1.93 1.60 - 2.40 mg/dL   B-Type Natriuretic Peptide   Result Value Ref Range     (H) 0 - 99 pg/mL   Troponin I, High Sensitivity, Initial   Result Value Ref Range    Troponin I, High Sensitivity 5 0 - 20 ng/L   Lactate   Result Value Ref Range    Lactate 1.3 0.4 - 2.0 mmol/L   Coagulation Screen   Result Value Ref Range    Protime 17.5 (H) 9.8 - 12.4 seconds    INR 1.6 (H) 0.9 - 1.1    aPTT 37 (H) 26 - 36 seconds   ECG 12 lead   Result Value Ref Range    Ventricular Rate 74 BPM    Atrial Rate 74 BPM    QRS Duration 84 ms    QT Interval 370 ms    QTC Calculation(Bazett) 410 ms    R Axis -13 degrees    T Axis -1 degrees    QRS Count 12 beats    Q Onset 215 ms    T Offset 400 ms    QTC Fredericia 397 ms   Troponin, High Sensitivity, 1 Hour   Result Value Ref Range     Troponin I, High Sensitivity 6 0 - 20 ng/L            Assessment & Plan    # Syncope and Collapse   # Hx of Atrial Fib on Eliquis  # Head Abrasion   # HTN/HLD    Admit to Medicine  Telemetry   Continue home medications when reconciled  Continue Eliquis  Neurovascular checks x 24 hours  TSH pending, A1C   Hold Eliquis for now   Consult Cardiology - risk vs benefits of Eliquis with most recent fall with head abrasion and fracture ribs.  Spoke with Dr. Castle - hold eliquis for tonight. Cardiology consult  No need for repeat CT head unless neurological deficits identified  CT head negative for acute process   CT cervical spine negative  Strict intake and output  Daily weight   Ortho static Vital signs   Fall  precautions   ECHO pending     # Diabetes Mellitus Type 2  Continue home medications  Diet Cardiac/Diabetic  Continue Sliding Scale SSI AC/HS   A1C 6.5  Encourage healthy lifestyle changes    # Right Rib Fractures  Splint  Pulmonary toileting  Pain control lidocaine  Tramadol  Limit nephrotoxic agents    DVTp: Eliquis- on hold -verify with Cardiology risks vs benefits  FULL Code  Diet: Cardiac/diabetic  Disposition: PT/OT home 24 hours    Triad soham Simon RN    Time spent  60  minutes obtaining labs, imaging, recommendations, interview, assessment, examination, medication review/ordering, and EMR review.    Plan of care was discussed extensively with patient, wife, 2 daughters. Patient verbalized understanding through teach back method. All questions and concerns addressed upon examination.     Of note, this documentation is completed using the Dragon Dictation system (voice recognition software). There may be spelling and/or grammatical errors that were not corrected prior to final submission.        Trista Block, APRN-CNP         [1]   Past Medical History:  Diagnosis Date    Benign prostatic hyperplasia with lower urinary tract symptoms     Enlarged prostate with lower urinary tract symptoms (LUTS)     Encounter for general adult medical examination without abnormal findings     Health care maintenance    Other bursitis of hip, unspecified hip     Bursitis of hip    Other conditions influencing health status     History of cough    Other fecal abnormalities 05/14/2021    Occult blood in stools    Other specified abnormal findings of blood chemistry     Creatinine elevation    Pain in left leg     Pain of left lower extremity    Personal history of other diseases of the circulatory system     History of coronary artery disease    Personal history of other diseases of the circulatory system     History of abnormal electrocardiography    Personal history of other diseases of the circulatory system 07/29/2019    History of hypertension    Personal history of other diseases of the digestive system     History of constipation    Personal history of other diseases of the musculoskeletal system and connective tissue     History of spinal stenosis    Personal history of other diseases of the musculoskeletal system and connective tissue     History of rheumatoid arthritis    Personal history of other diseases of the musculoskeletal system and connective tissue     History of backache    Personal history of other diseases of the musculoskeletal system and connective tissue     History of muscle spasm    Personal history of other diseases of the nervous system and sense organs     History of tinnitus    Personal history of other diseases of the nervous system and sense organs     History of hearing loss    Personal history of other diseases of the respiratory system     History of acute bronchitis    Personal history of other endocrine, nutritional and metabolic disease     History of hyperlipidemia    Personal history of other endocrine, nutritional and metabolic disease 09/01/2020    History of obesity    Personal history of other endocrine, nutritional and metabolic disease 03/27/2020    History of diabetes mellitus     Personal history of other infectious and parasitic diseases     History of candidiasis    Personal history of other specified conditions     History of nasal congestion    Stress incontinence (female) (male)     Male stress incontinence   [2]   Past Surgical History:  Procedure Laterality Date    OTHER SURGICAL HISTORY  03/11/2022    Cyst excision    OTHER SURGICAL HISTORY  03/11/2022    Cardiac catheterization    OTHER SURGICAL HISTORY  03/11/2022    Tooth extraction    OTHER SURGICAL HISTORY  06/02/2021    Neck surgery    OTHER SURGICAL HISTORY  06/02/2021    Colonoscopy    OTHER SURGICAL HISTORY  06/02/2021    Cataract surgery   [3]   Family History  Problem Relation Name Age of Onset    Heart disease Mother      Arthritis Mother     [4] [5] [6]

## 2025-05-29 ENCOUNTER — APPOINTMENT (OUTPATIENT)
Dept: RADIOLOGY | Facility: HOSPITAL | Age: 85
End: 2025-05-29
Payer: MEDICARE

## 2025-05-29 ENCOUNTER — APPOINTMENT (OUTPATIENT)
Dept: CARDIOLOGY | Facility: HOSPITAL | Age: 85
End: 2025-05-29
Payer: MEDICARE

## 2025-05-29 ENCOUNTER — HOME HEALTH ADMISSION (OUTPATIENT)
Dept: HOME HEALTH SERVICES | Facility: HOME HEALTH | Age: 85
End: 2025-05-29
Payer: MEDICARE

## 2025-05-29 ENCOUNTER — DOCUMENTATION (OUTPATIENT)
Dept: HOME HEALTH SERVICES | Facility: HOME HEALTH | Age: 85
End: 2025-05-29

## 2025-05-29 VITALS
HEIGHT: 68 IN | OXYGEN SATURATION: 97 % | SYSTOLIC BLOOD PRESSURE: 131 MMHG | WEIGHT: 176.81 LBS | BODY MASS INDEX: 26.8 KG/M2 | RESPIRATION RATE: 16 BRPM | DIASTOLIC BLOOD PRESSURE: 69 MMHG | TEMPERATURE: 98.2 F | HEART RATE: 91 BPM

## 2025-05-29 LAB
ANION GAP SERPL CALC-SCNC: 10 MMOL/L (ref 10–20)
AORTIC VALVE MEAN GRADIENT: 6 MMHG
AORTIC VALVE PEAK VELOCITY: 1.65 M/S
AV PEAK GRADIENT: 11 MMHG
AVA (PEAK VEL): 2.35 CM2
AVA (VTI): 2.16 CM2
BUN SERPL-MCNC: 19 MG/DL (ref 6–23)
CALCIUM SERPL-MCNC: 9.1 MG/DL (ref 8.6–10.3)
CHLORIDE SERPL-SCNC: 106 MMOL/L (ref 98–107)
CO2 SERPL-SCNC: 28 MMOL/L (ref 21–32)
CREAT SERPL-MCNC: 0.87 MG/DL (ref 0.5–1.3)
EGFRCR SERPLBLD CKD-EPI 2021: 86 ML/MIN/1.73M*2
EJECTION FRACTION APICAL 4 CHAMBER: 64.2
EJECTION FRACTION: 63 %
ERYTHROCYTE [DISTWIDTH] IN BLOOD BY AUTOMATED COUNT: 12.9 % (ref 11.5–14.5)
GLUCOSE BLD MANUAL STRIP-MCNC: 118 MG/DL (ref 74–99)
GLUCOSE BLD MANUAL STRIP-MCNC: 123 MG/DL (ref 74–99)
GLUCOSE SERPL-MCNC: 110 MG/DL (ref 74–99)
HCT VFR BLD AUTO: 33.3 % (ref 41–52)
HGB BLD-MCNC: 11.1 G/DL (ref 13.5–17.5)
HOLD SPECIMEN: NORMAL
HOLD SPECIMEN: NORMAL
LEFT ATRIUM VOLUME AREA LENGTH INDEX BSA: 40.3 ML/M2
LEFT VENTRICLE INTERNAL DIMENSION DIASTOLE: 4.4 CM (ref 3.5–6)
LEFT VENTRICULAR OUTFLOW TRACT DIAMETER: 2.1 CM
LV EJECTION FRACTION BIPLANE: 67 %
MCH RBC QN AUTO: 31.6 PG (ref 26–34)
MCHC RBC AUTO-ENTMCNC: 33.3 G/DL (ref 32–36)
MCV RBC AUTO: 95 FL (ref 80–100)
NRBC BLD-RTO: 0 /100 WBCS (ref 0–0)
PLATELET # BLD AUTO: 228 X10*3/UL (ref 150–450)
POTASSIUM SERPL-SCNC: 3.9 MMOL/L (ref 3.5–5.3)
RBC # BLD AUTO: 3.51 X10*6/UL (ref 4.5–5.9)
RIGHT VENTRICLE PEAK SYSTOLIC PRESSURE: 37 MMHG
SODIUM SERPL-SCNC: 140 MMOL/L (ref 136–145)
TRICUSPID ANNULAR PLANE SYSTOLIC EXCURSION: 1.9 CM
WBC # BLD AUTO: 8.1 X10*3/UL (ref 4.4–11.3)

## 2025-05-29 PROCEDURE — G0378 HOSPITAL OBSERVATION PER HR: HCPCS

## 2025-05-29 PROCEDURE — 97165 OT EVAL LOW COMPLEX 30 MIN: CPT | Mod: GO

## 2025-05-29 PROCEDURE — 82947 ASSAY GLUCOSE BLOOD QUANT: CPT

## 2025-05-29 PROCEDURE — 97161 PT EVAL LOW COMPLEX 20 MIN: CPT | Mod: GP | Performed by: PHYSICAL THERAPIST

## 2025-05-29 PROCEDURE — 71045 X-RAY EXAM CHEST 1 VIEW: CPT | Performed by: RADIOLOGY

## 2025-05-29 PROCEDURE — 71045 X-RAY EXAM CHEST 1 VIEW: CPT

## 2025-05-29 PROCEDURE — 2500000005 HC RX 250 GENERAL PHARMACY W/O HCPCS: Performed by: REGISTERED NURSE

## 2025-05-29 PROCEDURE — 93306 TTE W/DOPPLER COMPLETE: CPT

## 2025-05-29 PROCEDURE — 85027 COMPLETE CBC AUTOMATED: CPT | Performed by: REGISTERED NURSE

## 2025-05-29 PROCEDURE — 99232 SBSQ HOSP IP/OBS MODERATE 35: CPT | Performed by: REGISTERED NURSE

## 2025-05-29 PROCEDURE — 99223 1ST HOSP IP/OBS HIGH 75: CPT | Performed by: INTERNAL MEDICINE

## 2025-05-29 PROCEDURE — 82374 ASSAY BLOOD CARBON DIOXIDE: CPT | Performed by: REGISTERED NURSE

## 2025-05-29 PROCEDURE — 99239 HOSP IP/OBS DSCHRG MGMT >30: CPT | Performed by: REGISTERED NURSE

## 2025-05-29 PROCEDURE — 36415 COLL VENOUS BLD VENIPUNCTURE: CPT | Performed by: REGISTERED NURSE

## 2025-05-29 PROCEDURE — 2500000002 HC RX 250 W HCPCS SELF ADMINISTERED DRUGS (ALT 637 FOR MEDICARE OP, ALT 636 FOR OP/ED): Performed by: REGISTERED NURSE

## 2025-05-29 PROCEDURE — 2500000001 HC RX 250 WO HCPCS SELF ADMINISTERED DRUGS (ALT 637 FOR MEDICARE OP): Performed by: REGISTERED NURSE

## 2025-05-29 PROCEDURE — 2500000004 HC RX 250 GENERAL PHARMACY W/ HCPCS (ALT 636 FOR OP/ED): Performed by: REGISTERED NURSE

## 2025-05-29 PROCEDURE — 93306 TTE W/DOPPLER COMPLETE: CPT | Performed by: INTERNAL MEDICINE

## 2025-05-29 RX ORDER — LIDOCAINE 560 MG/1
1 PATCH PERCUTANEOUS; TOPICAL; TRANSDERMAL DAILY
Status: DISCONTINUED | OUTPATIENT
Start: 2025-05-29 | End: 2025-05-29 | Stop reason: HOSPADM

## 2025-05-29 RX ORDER — TRAMADOL HYDROCHLORIDE 50 MG/1
25 TABLET, FILM COATED ORAL EVERY 6 HOURS PRN
Status: DISCONTINUED | OUTPATIENT
Start: 2025-05-29 | End: 2025-05-29 | Stop reason: HOSPADM

## 2025-05-29 RX ORDER — ACETAMINOPHEN 325 MG/1
975 TABLET ORAL EVERY 8 HOURS
Status: DISCONTINUED | OUTPATIENT
Start: 2025-05-29 | End: 2025-05-29 | Stop reason: HOSPADM

## 2025-05-29 RX ORDER — LIDOCAINE 560 MG/1
1 PATCH PERCUTANEOUS; TOPICAL; TRANSDERMAL DAILY
Qty: 30 PATCH | Refills: 1 | Status: SHIPPED | OUTPATIENT
Start: 2025-05-30 | End: 2025-06-29

## 2025-05-29 RX ORDER — TRAMADOL HYDROCHLORIDE 50 MG/1
50 TABLET, FILM COATED ORAL EVERY 6 HOURS PRN
Status: DISCONTINUED | OUTPATIENT
Start: 2025-05-29 | End: 2025-05-29 | Stop reason: HOSPADM

## 2025-05-29 RX ADMIN — POLYETHYLENE GLYCOL 3350 17 G: 17 POWDER, FOR SOLUTION ORAL at 08:43

## 2025-05-29 RX ADMIN — TAMSULOSIN HYDROCHLORIDE 0.4 MG: 0.4 CAPSULE ORAL at 08:43

## 2025-05-29 RX ADMIN — HYDROXYCHLOROQUINE SULFATE 200 MG: 200 TABLET, FILM COATED ORAL at 08:43

## 2025-05-29 RX ADMIN — DOCUSATE SODIUM 100 MG: 100 CAPSULE, LIQUID FILLED ORAL at 08:43

## 2025-05-29 RX ADMIN — LOSARTAN POTASSIUM: 100 TABLET, FILM COATED ORAL at 08:43

## 2025-05-29 RX ADMIN — METOPROLOL SUCCINATE 25 MG: 25 TABLET, EXTENDED RELEASE ORAL at 08:43

## 2025-05-29 RX ADMIN — OXYBUTYNIN CHLORIDE 5 MG: 5 TABLET ORAL at 06:15

## 2025-05-29 RX ADMIN — LIDOCAINE 4% 1 PATCH: 40 PATCH TOPICAL at 08:43

## 2025-05-29 RX ADMIN — APIXABAN 5 MG: 5 TABLET, FILM COATED ORAL at 14:12

## 2025-05-29 RX ADMIN — OXYBUTYNIN CHLORIDE 5 MG: 5 TABLET ORAL at 13:24

## 2025-05-29 RX ADMIN — FINASTERIDE 5 MG: 5 TABLET, FILM COATED ORAL at 08:43

## 2025-05-29 RX ADMIN — ACETAMINOPHEN 975 MG: 325 TABLET ORAL at 08:43

## 2025-05-29 ASSESSMENT — COGNITIVE AND FUNCTIONAL STATUS - GENERAL
DRESSING REGULAR UPPER BODY CLOTHING: A LITTLE
TURNING FROM BACK TO SIDE WHILE IN FLAT BAD: A LITTLE
STANDING UP FROM CHAIR USING ARMS: A LITTLE
MOVING TO AND FROM BED TO CHAIR: A LITTLE
MOBILITY SCORE: 19
MOVING TO AND FROM BED TO CHAIR: A LITTLE
TOILETING: A LITTLE
EATING MEALS: A LITTLE
DRESSING REGULAR LOWER BODY CLOTHING: A LITTLE
PERSONAL GROOMING: A LITTLE
HELP NEEDED FOR BATHING: A LITTLE
HELP NEEDED FOR BATHING: A LITTLE
MOBILITY SCORE: 18
WALKING IN HOSPITAL ROOM: A LITTLE
MOVING FROM LYING ON BACK TO SITTING ON SIDE OF FLAT BED WITH BEDRAILS: A LITTLE
WALKING IN HOSPITAL ROOM: A LITTLE
DAILY ACTIVITIY SCORE: 21
CLIMB 3 TO 5 STEPS WITH RAILING: A LITTLE
CLIMB 3 TO 5 STEPS WITH RAILING: A LOT
STANDING UP FROM CHAIR USING ARMS: A LITTLE
DRESSING REGULAR LOWER BODY CLOTHING: A LOT

## 2025-05-29 ASSESSMENT — PAIN - FUNCTIONAL ASSESSMENT
PAIN_FUNCTIONAL_ASSESSMENT: 0-10

## 2025-05-29 ASSESSMENT — PAIN SCALES - GENERAL
PAINLEVEL_OUTOF10: 2
PAINLEVEL_OUTOF10: 0 - NO PAIN
PAINLEVEL_OUTOF10: 2
PAINLEVEL_OUTOF10: 0 - NO PAIN

## 2025-05-29 ASSESSMENT — ACTIVITIES OF DAILY LIVING (ADL)
BATHING_ASSISTANCE: MINIMAL
LACK_OF_TRANSPORTATION: NO

## 2025-05-29 NOTE — PROGRESS NOTES
05/29/25 1032   Discharge Planning   Living Arrangements Spouse/significant other   Support Systems Spouse/significant other;Children;Family members   Assistance Needed None, PTA independent ADLS and IADSL no AD, drives, fall PTA, denies other falls last 3 months, no DME   Type of Residence Private residence  (2 level home + basement (laundry- pt does), bedroom/full bathroom with tub shower upstairs- stair lift to upstairs, full bathroom with walk-in shower on 1st,no bathroom dme)   Number of Stairs Within Residence 13  (to basement laundry, stair lift to upstairs bed/bath)   Do you have animals or pets at home? No   Home or Post Acute Services In home services   Type of Home Care Services Home OT;Home PT   Expected Discharge Disposition Home H   Does the patient need discharge transport arranged? No   Financial Resource Strain   How hard is it for you to pay for the very basics like food, housing, medical care, and heating? Not hard   Housing Stability   In the last 12 months, was there a time when you were not able to pay the mortgage or rent on time? N   In the past 12 months, how many times have you moved where you were living? 0   At any time in the past 12 months, were you homeless or living in a shelter (including now)? N   Transportation Needs   In the past 12 months, has lack of transportation kept you from medical appointments or from getting medications? no   In the past 12 months, has lack of transportation kept you from meetings, work, or from getting things needed for daily living? No   Patient Choice   Provider Choice list and CMS website (https://medicare.gov/care-compare#search) for post-acute Quality and Resource Measure Data were provided and reviewed with: Patient   Patient / Family choosing to utilize agency / facility established prior to hospitalization No   Stroke Family Assessment   Stroke Family Assessment Needed No   Intensity of Service   Intensity of Service 0-30 min     Pt currently  in OBS status with DX syncope and collapse, hit head- laceration on back of head, and Right rib fractures 7th-9th. Pt states takes Eliquis at home. Pt states he went to glass of water and fell but unsure why. Pt from home with wife, states PTA completely independent ADLS and IADLS no DME, drives, denies other falls. PCP is  Ernestina Galeana, Pharmacy [facility], [facility] in [place], denies barriers. PT/OT eval ordered, awaiting evals. Pt states discharge preference is home, open to HHC if needed and would prefer to stay in  system with [facility]. Demographics verified. CT team will continue monitoring case progression and follow up for AMPAC scores to aide in discharge planning.     Update: AMPAC scores are PT (18) OT (21) recommending continued therapy at low level/intensity. Pt agreeable to [facility] as previously stated. Attending notified.

## 2025-05-29 NOTE — PROGRESS NOTES
Physical Therapy                 Therapy Communication Note    Patient Name: Artis Horowitz  MRN: [id number]  Department: [place] [address]  Room: [address] [place], [place] [address]  Today's Date: 5/29/2025     Discipline: Physical Therapy    Missed Visit: PT Missed Visit: Yes     Missed Visit Reason: Missed Visit Reason: Patient in a medical procedure    Missed Time: Attempt    Comment:  Received order for P.T. eval. Chart reviewed. Attempted to see Pt. for P.T. eval but Pt. was at [facility].  Will re-attempt P.T. eval when Pt. is medically appropriate and available.

## 2025-05-29 NOTE — PROGRESS NOTES
Physical Therapy    Physical Therapy Evaluation    Patient Name: Artis Horowitz  MRN: [id number]  Today's Date: 5/29/2025   Time Calculation  Start Time: 1048  Stop Time: 1059  Time Calculation (min): 11 min  917/917-A    Assessment/Plan   PT Assessment  PT Assessment Results: Decreased endurance, Impaired balance, Decreased mobility  Rehab Prognosis: Good  Barriers to Discharge Home: No anticipated barriers  Evaluation/Treatment Tolerance: Patient tolerated treatment well  Medical Staff Made Aware: Yes  Strengths: Living arrangement secure, Support of Caregivers  Assessment Comment: Recommend continued therapy in acute care followed by continued therapy at a low intensity level following D/C. Pt may beneft from amb trial with cane.  End of Session Patient Position: Up in chair, Alarm on (Call light within reach)  IP OR SWING BED PT PLAN  Inpatient or Swing Bed: Inpatient  PT Plan  Treatment/Interventions: Bed mobility, Transfer training, Gait training, Strengthening, Therapeutic exercise  PT Plan: Ongoing PT  PT Frequency: 2 times per week  PT Discharge Recommendations: Low intensity level of continued care  Equipment Recommended upon Discharge:  (TBD)  PT Recommended Transfer Status: Contact guard  Physical Therapy eval completed per MD requisition. P.T. recommendations as outlined above. Recommend D/C from acute care when medically appropriate as deemed by medical staff.      General Visit Information:  General  Reason for Referral: impaired mobility  Referred By: OLIVIA Zaragoza-CNP PT/OT eval and tx 5/28/25  Past Medical History Relevant to Rehab: includes: dyslipidemia, HTN, arthritis, afib, DM, pt on eliquis  Family/Caregiver Present: Yes  Caregiver Feedback: spouse and dtr present  Co-Treatment: OT  Co-Treatment Reason: Pt. seen with OT to maximize safety and function  Patient Position Received: Up in chair, Alarm off, not on at start of session  Preferred Learning Style: auditory, verbal  General Comment:  Pt. is an 83yo who [place] [facility] [place] [facility] with syncopal episode, pain in R ribs.   C-spine CT (5/28) (-) negative findings, (+) degenerative changes.   Head CT (5/28)(-) acute findings   Chest CT (5/28) (+) mildly displaced fx lateral aspect R 7th, 8th, 9th ribs, (-) pneumothorax.   In ED, BNP = 136.    Home Living:  Home Living  Home Living Comments: Pt. lives with spouse in a 2 level house with 1 JESS without HR. Bed/bath on 2nd floor with stair lift to access. Pt. uses full bathroom on 1st floor with walkin shower without a seat or grab bars.    Prior Level of Function:  Prior Function Per Pt/Caregiver Report  Prior Function Comments: Pt. amb without AD PTA but owns a cane. Pt. stated she was I with ADLs and med management and shared IADLs with spouse PTA. PT. stated he drove PTA. Pt. denies any other falls in last 3 months.    Precautions:  Precautions  Medical Precautions: Fall precautions  Precautions Comment: Per EMR: High fall risk    Objective     Pain:  Pain Assessment  Pain Assessment: 0-10  0-10 (Numeric) Pain Score: 2  Pain Type: Acute pain  Pain Location: Rib cage  Pain Orientation: Right    Cognition:  Cognition  Overall Cognitive Status: Within Functional Limits    General Assessments:  General Observation  General Observation: Tele   Activity Tolerance  Endurance:  (Fair)  Sensation  Light Touch: No apparent deficits                 Static Standing Balance  Static Standing-Comment/Number of Minutes: Good static and dynamic sitting balance  Dynamic Standing Balance  Dynamic Standing-Comments: Good static and Fair+dynamic standing balance    Functional Assessments:     Bed Mobility  Bed Mobility: No  Transfers  Transfer: Yes  Transfer 1  Technique 1: Sit to stand, Stand to sit  Transfer Device 1:  (NoAD)  Transfer Level of Assistance 1: Minimum assistance (SBA)  Transfers 3  Trials/Comments 3: A for balance, safety.  Ambulation/Gait Training  Ambulation/Gait Training Performed:  Yes  Ambulation/Gait Training 1  Surface 1: Level tile  Device 1: No device  Assistance 1: Contact guard  Quality of Gait 1:  (reciprocal gait with even stride lengths. 1 lateral LOB to the L that required A to recover. Pt. also tripped over his wife's foot as he appraoched the chair but recovered his balance on his own.)  Comments/Distance (ft) 1: 80'; CGA with 1 LOB that required A  to recover. Recomend trial with cane  Stairs  Stairs: No       Extremity/Trunk Assessments:  RUE   RUE : Within Functional Limits  LUE   LUE: Within Functional Limits  RLE   RLE : Within Functional Limits  LLE   LLE : Within Functional Limits    Outcome Measures:     [facility] Basic Mobility  Turning from your back to your side while in a flat bed without using bedrails: A little  Moving from lying on your back to sitting on the side of a flat bed without using bedrails: A little  Moving to and from bed to chair (including a wheelchair): A little  Standing up from a chair using your arms (e.g. wheelchair or bedside chair): A little  To walk in hospital room: A little  Climbing 3-5 steps with railing: A little  Basic Mobility - Total Score: 18                                                             Goals:  Encounter Problems       Encounter Problems (Active)       PT Problem       Pt. will transfer supine/sit with MOD I (Progressing)       Start:  05/29/25    Expected End:  06/12/25            Pt. will transfer sit/stand with cane with MOD I (Progressing)       Start:  05/29/25    Expected End:  06/12/25            Pt.will ambulate 100' with cane with MOD I (Progressing)       Start:  05/29/25    Expected End:  06/12/25            Pt. will amb up/down curb step with cane with CGA  (Not Progressing)       Start:  05/29/25    Expected End:  06/12/25            Pt. will perform 2 x 15 B LE AROM exercises  (Not Progressing)       Start:  05/29/25    Expected End:  06/12/25                 Education Documentation  Mobility Training, taught  by Rock Cortez, PT at 5/29/2025 11:59 AM.  Learner: Significant Other, Family, Patient  Readiness: Acceptance  Method: Explanation  Response: Verbalizes Understanding, Needs Reinforcement  Comment: Role of PT, transfers, amb, safety, PT POC

## 2025-05-29 NOTE — CARE PLAN
The patient's goals for the shift include      The clinical goals for the shift include Patient will demonstrate use of Incentive Spirometer throughout shift    Over the shift, the patient did not make progress toward the following goals. Barriers to progression include none. Recommendations to address these barriers include none.

## 2025-05-29 NOTE — PROGRESS NOTES
"Trauma Progress Note    Patient: Artis Horowitz  Unit/Bed: 917/917-A  YOB: 1940  MRN: [id number]  Acct: [id number]   Admitting Diagnosis: Syncope and collapse [R55]  Closed fracture of multiple ribs of right side, initial encounter [S22.41XA]  Date:  5/28/2025  Hospital Day: 0  Attending: Scott Portillo DO    Complaint:  Chief Complaint   Patient presents with    Syncope     \"I passed out last night.  Passed out and Hit head and right side. Is on Eliquis.\"    Trauma      Subjective  Patient seen and examined this morning. No acute events overnight. Patient states he does have tenderness to his right chest but otherwise he feels good. He denies abdominal pain.    PHYSICAL EXAM:  Physical Exam  Vitals reviewed.   Constitutional:       Appearance: Normal appearance.   HENT:      Head: Normocephalic.      Nose: Nose normal.      Mouth/Throat:      Mouth: Mucous membranes are moist.   Eyes:      Extraocular Movements: Extraocular movements intact.      Pupils: Pupils are equal, round, and reactive to light.   Cardiovascular:      Rate and Rhythm: Normal rate.   Pulmonary:      Effort: Pulmonary effort is normal.      Breath sounds: Examination of the right-lower field reveals decreased breath sounds. Decreased breath sounds present.   Chest:      Chest wall: Tenderness (right) present.   Abdominal:      General: Abdomen is flat. Bowel sounds are normal.      Palpations: Abdomen is soft.   Musculoskeletal:         General: Normal range of motion.      Cervical back: Normal range of motion.   Skin:     General: Skin is warm.      Capillary Refill: Capillary refill takes less than 2 seconds.   Neurological:      General: No focal deficit present.      Mental Status: He is alert and oriented to person, place, and time.   Psychiatric:         Mood and Affect: Mood normal.       Vital signs in last 24 hours:  Vitals:    05/29/25 0720   BP: 107/66   Pulse: 95   Resp: 16   Temp: 36.8 °C (98.2 °F)   SpO2: 97% "     Intake/Output this shift:    Intake/Output Summary (Last 24 hours) at 5/29/2025 0743  Last data filed at 5/29/2025 0649  Gross per 24 hour   Intake 458.33 ml   Output 851 ml   Net -392.67 ml      Allergies:  Allergies[1]   Medications:  Scheduled medications  Scheduled Medications[2]  Continuous medications  Continuous Medications[3]  PRN medications  PRN Medications[4]  Labs:  Results for orders placed or performed during the hospital encounter of 05/28/25 (from the past 24 hours)   CBC and Auto Differential   Result Value Ref Range    WBC 10.2 4.4 - 11.3 x10*3/uL    nRBC 0.0 0.0 - 0.0 /100 WBCs    RBC 3.65 (L) 4.50 - 5.90 x10*6/uL    Hemoglobin 11.5 (L) 13.5 - 17.5 g/dL    Hematocrit 34.9 (L) 41.0 - 52.0 %    MCV 96 80 - 100 fL    MCH 31.5 26.0 - 34.0 pg    MCHC 33.0 32.0 - 36.0 g/dL    RDW 12.9 11.5 - 14.5 %    Platelets 261 150 - 450 x10*3/uL    Neutrophils % 76.9 40.0 - 80.0 %    Immature Granulocytes %, Automated 0.4 0.0 - 0.9 %    Lymphocytes % 12.9 13.0 - 44.0 %    Monocytes % 8.8 2.0 - 10.0 %    Eosinophils % 0.5 0.0 - 6.0 %    Basophils % 0.5 0.0 - 2.0 %    Neutrophils Absolute 7.84 (H) 1.60 - 5.50 x10*3/uL    Immature Granulocytes Absolute, Automated 0.04 0.00 - 0.50 x10*3/uL    Lymphocytes Absolute 1.31 0.80 - 3.00 x10*3/uL    Monocytes Absolute 0.90 (H) 0.05 - 0.80 x10*3/uL    Eosinophils Absolute 0.05 0.00 - 0.40 x10*3/uL    Basophils Absolute 0.05 0.00 - 0.10 x10*3/uL   Comprehensive metabolic panel   Result Value Ref Range    Glucose 192 (H) 74 - 99 mg/dL    Sodium 136 136 - 145 mmol/L    Potassium 3.7 3.5 - 5.3 mmol/L    Chloride 100 98 - 107 mmol/L    Bicarbonate 29 21 - 32 mmol/L    Anion Gap 11 10 - 20 mmol/L    Urea Nitrogen 29 (H) 6 - 23 mg/dL    Creatinine 1.20 0.50 - 1.30 mg/dL    eGFR 60 (L) >60 mL/min/1.73m*2    Calcium 9.4 8.6 - 10.3 mg/dL    Albumin 4.2 3.4 - 5.0 g/dL    Alkaline Phosphatase 85 33 - 136 U/L    Total Protein 7.0 6.4 - 8.2 g/dL    AST 15 9 - 39 U/L    Bilirubin, Total  0.6 0.0 - 1.2 mg/dL    ALT 11 10 - 52 U/L   Magnesium   Result Value Ref Range    Magnesium 1.93 1.60 - 2.40 mg/dL   B-Type Natriuretic Peptide   Result Value Ref Range     (H) 0 - 99 pg/mL   Troponin I, High Sensitivity, Initial   Result Value Ref Range    Troponin I, High Sensitivity 5 0 - 20 ng/L   Lactate   Result Value Ref Range    Lactate 1.3 0.4 - 2.0 mmol/L   Coagulation Screen   Result Value Ref Range    Protime 17.5 (H) 9.8 - 12.4 seconds    INR 1.6 (H) 0.9 - 1.1    aPTT 37 (H) 26 - 36 seconds   TSH   Result Value Ref Range    Thyroid Stimulating Hormone 1.46 0.44 - 3.98 mIU/L   ECG 12 lead   Result Value Ref Range    Ventricular Rate 74 BPM    Atrial Rate 74 BPM    QRS Duration 84 ms    QT Interval 370 ms    QTC Calculation(Bazett) 410 ms    R Axis -13 degrees    T Axis -1 degrees    QRS Count 12 beats    Q Onset 215 ms    T Offset 400 ms    QTC Fredericia 397 ms   Troponin, High Sensitivity, 1 Hour   Result Value Ref Range    Troponin I, High Sensitivity 6 0 - 20 ng/L   POCT GLUCOSE   Result Value Ref Range    POCT Glucose 108 (H) 74 - 99 mg/dL   POCT GLUCOSE   Result Value Ref Range    POCT Glucose 146 (H) 74 - 99 mg/dL   Basic metabolic panel   Result Value Ref Range    Glucose 110 (H) 74 - 99 mg/dL    Sodium 140 136 - 145 mmol/L    Potassium 3.9 3.5 - 5.3 mmol/L    Chloride 106 98 - 107 mmol/L    Bicarbonate 28 21 - 32 mmol/L    Anion Gap 10 10 - 20 mmol/L    Urea Nitrogen 19 6 - 23 mg/dL    Creatinine 0.87 0.50 - 1.30 mg/dL    eGFR 86 >60 mL/min/1.73m*2    Calcium 9.1 8.6 - 10.3 mg/dL   CBC   Result Value Ref Range    WBC 8.1 4.4 - 11.3 x10*3/uL    nRBC 0.0 0.0 - 0.0 /100 WBCs    RBC 3.51 (L) 4.50 - 5.90 x10*6/uL    Hemoglobin 11.1 (L) 13.5 - 17.5 g/dL    Hematocrit 33.3 (L) 41.0 - 52.0 %    MCV 95 80 - 100 fL    MCH 31.6 26.0 - 34.0 pg    MCHC 33.3 32.0 - 36.0 g/dL    RDW 12.9 11.5 - 14.5 %    Platelets 228 150 - 450 x10*3/uL   POCT GLUCOSE   Result Value Ref Range    POCT Glucose 118 (H) 74  - 99 mg/dL      Imaging:  Imaging  CT chest wo IV contrast  Result Date: 5/28/2025  1. Right-sided rib fractures, as above. 2. Scarring and/or atelectasis at the lung bases bilaterally. 3. No pleural effusion or pneumothorax identified.   MACRO: None   Signed by: Dave Ortiz 5/28/2025 10:57 AM Dictation workstation:   [id number]    CT cervical spine wo IV contrast  Result Date: 5/28/2025  1.  No CT evidence of acute fracture. 2.  Degenerative changes throughout the cervical spine, as above.   Signed by: Dave Ortiz 5/28/2025 10:49 AM Dictation workstation:   [id number]    CT head wo IV contrast  Result Date: 5/28/2025  No evidence of acute cortical infarct or intracranial hemorrhage.   MACRO: None     Signed by: Dave Ortiz 5/28/2025 10:47 AM Dictation workstation:   [id number]      Cardiology, Vascular, and Other Imaging  ECG 12 lead  Result Date: 5/28/2025  Atrial fibrillation Low voltage QRS Inferior infarct , age undetermined Abnormal ECG When compared with ECG of 06-JUL-2021 08:56, Atrial fibrillation has replaced Sinus rhythm T wave amplitude has decreased in Anterior leads See ED provider note for full interpretation and clinical correlation Confirmed by Barbara De Jesus ([id number]) on 5/28/2025 2:12:59 PM       Assessment  S/P fall-tertiary exam  Right 7th-9th rib fracture    On exam patient is alert and oriented x 4.  PERRLA.  Sensation intact.  Motor function intact.  Tenderness with palpation to right chest wall.  Diminished breath sounds to right lower lobe.  No tenderness with palpation to abdomen.  Palpable.  Labs this morning show no leukocytosis.  Hemoglobin stable.  Chest x-ray negative for pneumothorax.  Did show some atelectasis.  Afebrile.      Plan  -Okay to discharge from a trauma standpoint  -Trauma service to sign off please reach back out with any questions or concerns        Further recommendations per Dr. Buffy Cruz, APRN-CNP    Time spent  37  minutes obtaining labs,  imaging, recommendations, interview, assessment, examination, medication review/ordering, and EMR review.    Plan of care was discussed extensively with patient. Patient verbalized understanding through teach back method. All questions and concerns addressed upon examination.     Of note, this documentation is completed using the Dragon Dictation system (voice recognition software). There may be spelling and/or grammatical errors that were not corrected prior to final submission.         [1] No Known Allergies  [2] [Held by provider] apixaban, 5 mg, oral, BID  aspirin, 81 mg, oral, Nightly  docusate sodium, 100 mg, oral, BID  finasteride, 5 mg, oral, Daily  hydroxychloroquine, 200 mg, oral, Daily  insulin lispro, 0-5 Units, subcutaneous, TID AC  losartan 100 mg, hydroCHLOROthiazide 25 mg for Hyzaar 100 mg-25 mg, , oral, Daily  metoprolol succinate XL, 25 mg, oral, Daily  oxyBUTYnin, 5 mg, oral, 4x daily  polyethylene glycol, 17 g, oral, Daily  simvastatin, 40 mg, oral, Nightly  tamsulosin, 0.4 mg, oral, Daily  [3]    [4] PRN medications: acetaminophen **OR** acetaminophen **OR** acetaminophen, acetaminophen **OR** acetaminophen **OR** acetaminophen, dextrose, dextrose, glucagon, glucagon, ondansetron **OR** ondansetron, traMADol

## 2025-05-29 NOTE — PROGRESS NOTES
Occupational Therapy    Evaluation    Patient Name: Artis Horowitz  MRN: [id number]  : 1940  Today's Date: 25  Time Calculation  Start Time: 1047  Stop Time: 1055  Time Calculation (min): 8 min     917/917-A    Assessment:  OT Assessment: pt presents with impairments in ADLs and decreased indep with functional mobility, will benefit from con't skilled OT to address impairments  Prognosis: Good  Barriers to Discharge Home: No anticipated barriers  End of Session Patient Position: Up in chair, Alarm on (call light in reach, spouse present)  OT Assessment Results: Decreased ADL status, Decreased endurance, Decreased functional mobility  Prognosis: Good    Plan:  Treatment Interventions: ADL retraining, Functional transfer training, Endurance training, Patient/family training  OT Frequency: 2 times per week  OT Discharge Recommendations: Low intensity level of continued care  Equipment Recommended upon Discharge:  (shower seat)  OT Recommended Transfer Status: Stand by assist  OT - OK to Discharge: Yes  Treatment Interventions: ADL retraining, Functional transfer training, Endurance training, Patient/family training  Subjective     Current Problem:  1. Syncope and collapse  Transthoracic Echo Complete    Transthoracic Echo Complete    Holter Or Event Cardiac Monitor      2. Closed fracture of multiple ribs of right side, initial encounter        3. Coronary artery disease involving native coronary artery of native heart without angina pectoris  Transthoracic Echo Complete    Transthoracic Echo Complete      4. Essential hypertension  Transthoracic Echo Complete    Transthoracic Echo Complete        Medical History[1]  Surgical History[2]    General:   OT Received On: 25  General  Reason for Referral: ADL impairment  Referred By: JONAH Block PT/OT eval and tx 25  Past Medical History Relevant to Rehab: dyslipidemia, HTN, arthritis, afib, DM, pt on eliquis  Family/Caregiver Present:  Yes  Caregiver Feedback: spouse and dtr present  Co-Treatment: PT  Co-Treatment Reason: maximize pt and staff safety  Patient Position Received: Up in chair, Alarm off, not on at start of session  General Comment: pt to ED 5/28 with syncopal episode, pain in R ribs. CT cspine 5/28 negative acute, + degenerative changes. CT head negative, CT chest 5/28 mildly displaced fx lateral aspect R 7th, 8th, 9th ribs.     Precautions:  Medical Precautions: Fall precautions           Pain:  Pain Assessment  Pain Assessment: 0-10  0-10 (Numeric) Pain Score: 2  Pain Type: Acute pain  Pain Location: Rib cage  Pain Orientation: Right  Objective     Cognition:  Overall Cognitive Status: Within Functional Limits             Home Living:  Home Living Comments: pt lives iwth spouse 2 story home, bed and bath on 2nd floor with HR. Also has stair lift to 2nd floor. 1 JESS no HR. Walk in shower on 1st floor, no seat or grab bar. No AD    Prior Function:  Prior Function Comments: pt indep with ADLS, shares IADLS with spouse, indep with med mgmt. Amb without AD. Drives, reports one fall, cause of adm.           Activities of Daily Living:   Eating Assistance: Independent  Grooming Assistance: Independent  Bathing Assistance: Minimal  UE Dressing Assistance: Independent  LE Dressing Assistance: Stand by  LE Dressing Deficit: Don/doff R shoe, Don/doff L shoe  Toileting Assistance with Device: Stand by  Functional Assistance:  (pt ambulated with CGA50' without AD, one LOB, min A to regain balance)                         Activity Tolerance:  Endurance:  (fair)           Bed Mobility/Transfers: Bed Mobility  Bed Mobility: No  Transfers  Transfer:  (sit to stand to sit from chair, SBA)                Balance:  Static Sitting: good  Dynamic Sitting: good -  Static Standing: fair+  Dynamic Standing: fair          Vision:Vision - Basic Assessment  Current Vision: Wears glasses all the time        Sensation:  Light Touch: No apparent  deficits    Strength:  Strength Comments: B UE 4/5 throughout            Extremities: RUE   RUE : Within Functional Limits and LUE   LUE: Within Functional Limits    Outcome Measures: [facility] Daily Activity  Putting on and taking off regular lower body clothing: A little  Bathing (including washing, rinsing, drying): A little  Putting on and taking off regular upper body clothing: None  Toileting, which includes using toilet, bedpan or urinal: A little  Taking care of personal grooming such as brushing teeth: None  Eating Meals: None  Daily Activity - Total Score: 21    Education Documentation  ADL Training, taught by Magali Weston OT at 5/29/2025 11:56 AM.  Learner: Patient  Readiness: Acceptance  Method: Explanation  Response: Verbalizes Understanding, Needs Reinforcement                 Goals:  Encounter Problems       Encounter Problems (Active)       OT Goals       Pt will dress LB indep (Progressing)       Start:  05/29/25    Expected End:  06/12/25            Pt will transfer to bed, chair, toilet with modified indep (Progressing)       Start:  05/29/25    Expected End:  06/12/25            Pt will tolerate 10 minutes of functional activity with one rest break (Progressing)       Start:  05/29/25    Expected End:  06/12/25            Pt will verbalize 3 energy conservation strategies to increase indep with aDLS (Progressing)       Start:  05/29/25    Expected End:  06/12/25                        [1]   Past Medical History:  Diagnosis Date    Benign prostatic hyperplasia with lower urinary tract symptoms     Enlarged prostate with lower urinary tract symptoms (LUTS)    Encounter for general adult medical examination without abnormal findings     Health care maintenance    Other bursitis of hip, unspecified hip     Bursitis of hip    Other conditions influencing health status     History of cough    Other fecal abnormalities 05/14/2021    Occult blood in stools    Other specified abnormal findings of blood  chemistry     Creatinine elevation    Pain in left leg     Pain of left lower extremity    Personal history of other diseases of the circulatory system     History of coronary artery disease    Personal history of other diseases of the circulatory system     History of abnormal electrocardiography    Personal history of other diseases of the circulatory system 07/29/2019    History of hypertension    Personal history of other diseases of the digestive system     History of constipation    Personal history of other diseases of the musculoskeletal system and connective tissue     History of spinal stenosis    Personal history of other diseases of the musculoskeletal system and connective tissue     History of rheumatoid arthritis    Personal history of other diseases of the musculoskeletal system and connective tissue     History of backache    Personal history of other diseases of the musculoskeletal system and connective tissue     History of muscle spasm    Personal history of other diseases of the nervous system and sense organs     History of tinnitus    Personal history of other diseases of the nervous system and sense organs     History of hearing loss    Personal history of other diseases of the respiratory system     History of acute bronchitis    Personal history of other endocrine, nutritional and metabolic disease     History of hyperlipidemia    Personal history of other endocrine, nutritional and metabolic disease 09/01/2020    History of obesity    Personal history of other endocrine, nutritional and metabolic disease 03/27/2020    History of diabetes mellitus    Personal history of other infectious and parasitic diseases     History of candidiasis    Personal history of other specified conditions     History of nasal congestion    Stress incontinence (female) (male)     Male stress incontinence   [2]   Past Surgical History:  Procedure Laterality Date    OTHER SURGICAL HISTORY  03/11/2022    Cyst  excision    OTHER SURGICAL HISTORY  03/11/2022    Cardiac catheterization    OTHER SURGICAL HISTORY  03/11/2022    Tooth extraction    OTHER SURGICAL HISTORY  06/02/2021    Neck surgery    OTHER SURGICAL HISTORY  06/02/2021    Colonoscopy    OTHER SURGICAL HISTORY  06/02/2021    Cataract surgery

## 2025-05-29 NOTE — CONSULTS
Inpatient consult to Cardiology  Consult performed by: Roe Cedillo DO  Consult ordered by: OLIVIA Lara-CNP  Reason for consult: Evaluate need for anticoagulation                                                          Date:   5/29/2025  Patient name:  Artis Horowitz  Date of admission:  5/28/2025 10:35 AM  MRN:   [id number]  YOB: 1940  Time of Consult:  10:21 AM    Consulting Cardiologist/LEE: OLIVIA Arora, CNP  Primary Cardiologist: Dr. Arnett  Referring Provider: Dr. Portillo      Admission Diagnosis:     Syncope and collapse      History of Present Illness:      84-year-old gentleman with past medical history of diabetes mellitus type 2, hypertension, paroxysmal atrial fibrillation on Eliquis, hyperlipidemia, mild CAD who presented to [facility] emergency department on May 28 with complaints of syncopal episode.  The patient reports that he was getting a drink of water and the next thing he remembers he was on the floor.  He denies any dizziness or lightheadedness prior to such events.  He denies any chest pain, shortness of breath, palpitations, headache or blurry vision.  He did have a mild headache at that time but was treated with Tylenol.  He remains anticoagulated with Eliquis for paroxysmal atrial fibrillation.  He believes when he fell he hit the table on his way down which may have caused his ribs to hurt.  He was found by his wife on the floor and that he was only there for few minutes at most.  He does have some right sided rib fractures.  In the emergency department he was hemodynamically stable, on room air, BNP was 136, troponin was negative, WBCs of 10,000.  Noted right sided 7th through 9th rib fractures with no pleural effusion or pneumothorax.  CT head without infarct or intracranial hemorrhage.  CT C-spine was negative.  He was seen by trauma service.  His normal cardiologist is Dr. Fang.   General cardiology was consulted due to the need for anticoagulation in the setting of traumatic fall.     Previous Cardiac Testing:  I personally reviewed previous cardiac testing and agree with the current findings.    Echocardiogram February 2020  CONCLUSIONS:   1. The left ventricular systolic function is normal.   2. The left atrium is moderately dilated.   3. No left atrial mass.   4. No left atrial thrombus.     Carotid artery duplex July 2020  CONCLUSIONS:  Right Carotid: Findings are consistent with less than 50% stenosis of the right proximal ICA. Laminar flow seen by color Doppler. Right external carotid artery appears patent with no evidence of stenosis. No evidence of hemodynamically significant stenosis of the right common carotid artery. The right vertebral artery is patent with antegrade flow.  Left Carotid: Findings are consistent with less than 50% stenosis of the left proximal ICA. Laminar flow seen by color Doppler. Left external carotid artery appears patent with no evidence of stenosis. No evidence of hemodynamically significant stenosis of the left common carotid artery. The left vertebral artery is patent with antegrade flow.     Imaging & Doppler Findings:  Right Plaque Morph: The proximal right internal carotid artery demonstrates intimal thickening plaque. The proximal right external carotid artery demonstrates intimal thickening plaque.  Left Plaque Morph: The proximal left internal carotid artery demonstrates intimal thickening plaque. The proximal left external carotid artery demonstrates intimal thickening plaque.    Left heart catheterization January 2020  CONCLUSIONS:   1. Left Main Coronary Artery: This artery is normal.   2. Left Anterior Descending Artery: presents luminal irregularities.   3. Mid LAD Lesion: The percent stenosis is 30%.   4. Circumflex Coronary Artery: presents luminal irregularities.   5. Right Coronary Artery: presents luminal irregularities.   6. The Left  Ventricular Ejection Fraction is 55%.         Allergies:     No Known Allergies      Past Medical History:     See above    Past Surgical History:     SurgeryDateSite/LateralityCommentsXCAPSL CTRC RMVL INSJ IO LENS PROSTH W/O ECP9/14/08 OD   Cataract Extraction with PC IOL    XCAPSL CTRC RMVL INSJ IO LENS PROSTH W/O ECP9/1/05 OS   Cataract Extraction with PC IOL    REPAIR OF ENTROPION; EXTENSIVE 01/12/2024Left       Family History:     Diabetes mellitus    Social History:     Occasional EtOH use, denies nicotine abuse    CURRENT INPATIENT MEDICATIONS    Scheduled Medications[1]  Continuous Medications[2]  Current Outpatient Medications   Medication Instructions    Accu-Chek Beulah Plus test strp strip USE ONE STRIP TO CHECK GLUCOSE ONCE DAILY    acetaminophen (ARTHRITIS PAIN RELIEF (ACETAM)) 1,300 mg, Every 8 hours PRN    aspirin 81 mg, oral, Nightly    docusate sodium (Colace) 100 mg capsule 1 capsule, 2 times daily PRN    Eliquis 5 mg, oral, 2 times daily    finasteride (PROSCAR) 5 mg, oral, Daily    halobetasol (UltraVATE) 0.05 % cream to affected area    hydroxychloroquine (Plaquenil) 200 mg tablet Daily    losartan-hydrochlorothiazide (Hyzaar) 100-25 mg tablet 1 tablet, oral, Daily    metFORMIN XR (GLUCOPHAGE-XR) 500 mg, oral, Daily    metoprolol succinate XL (TOPROL-XL) 25 mg, oral, Daily    simvastatin (ZOCOR) 40 mg, oral, Nightly    tamsulosin (FLOMAX) 0.4 mg, oral, Daily    trospium (SANCTURA XR) 60 mg, oral, Daily    Unilet Lancet 33 gauge misc USE AS DIRECTED to check blood sugar ONCE DAILY        Review of Systems:      12 point review of systems was obtained in detail and is negative other than that detailed above.    Vital Signs:     Vitals:    05/28/25 1932 05/28/25 2340 05/29/25 0600 05/29/25 0720   BP: 125/63 118/66  107/66   BP Location:       Patient Position:    Sitting   Pulse: 79 73  95   Resp: 18 18  16   Temp: 37.3 °C (99.1 °F) 36.9 °C (98.4 °F)  36.8 °C (98.2 °F)   TempSrc:       SpO2: 94%  94%  97%   Weight:   80.2 kg (176 lb 12.9 oz)    Height:           Intake/Output Summary (Last 24 hours) at 5/29/2025 1021  Last data filed at 5/29/2025 0649  Gross per 24 hour   Intake 458.33 ml   Output 851 ml   Net -392.67 ml       Wt Readings from Last 4 Encounters:   05/29/25 80.2 kg (176 lb 12.9 oz)   04/23/25 78.5 kg (173 lb)   12/27/24 81.2 kg (179 lb)   12/17/24 80.3 kg (177 lb)       Physical Examination:     Physical Exam  Vitals and nursing note reviewed.   HENT:      Head: Normocephalic.      Mouth/Throat:      Mouth: Mucous membranes are moist.   Eyes:      Pupils: Pupils are equal, round, and reactive to light.   Cardiovascular:      Rate and Rhythm: Normal rate. Rhythm irregular.      Pulses: Normal pulses.   Pulmonary:      Effort: Pulmonary effort is normal.   Abdominal:      General: Bowel sounds are normal.      Palpations: Abdomen is soft.   Musculoskeletal:         General: Normal range of motion.   Skin:     General: Skin is warm and dry.      Capillary Refill: Capillary refill takes less than 2 seconds.   Neurological:      Mental Status: He is alert and oriented to person, place, and time.   Psychiatric:         Mood and Affect: Mood normal.           Lab:     CBC:   Results from last 7 days   Lab Units 05/29/25  0604 05/28/25  1030   WBC AUTO x10*3/uL 8.1 10.2   RBC AUTO x10*6/uL 3.51* 3.65*   HEMOGLOBIN g/dL 11.1* 11.5*   HEMATOCRIT % 33.3* 34.9*   MCV fL 95 96   MCH pg 31.6 31.5   MCHC g/dL 33.3 33.0   RDW % 12.9 12.9   PLATELETS AUTO x10*3/uL 228 261     CMP:    Results from last 7 days   Lab Units 05/29/25  0603 05/28/25  1030   SODIUM mmol/L 140 136   POTASSIUM mmol/L 3.9 3.7   CHLORIDE mmol/L 106 100   CO2 mmol/L 28 29   BUN mg/dL 19 29*   CREATININE mg/dL 0.87 1.20   GLUCOSE mg/dL 110* 192*   PROTEIN TOTAL g/dL  --  7.0   CALCIUM mg/dL 9.1 9.4   BILIRUBIN TOTAL mg/dL  --  0.6   ALK PHOS U/L  --  85   AST U/L  --  15   ALT U/L  --  11     BMP:    Results from last 7 days   Lab Units  05/29/25  0603 05/28/25  1030   SODIUM mmol/L 140 136   POTASSIUM mmol/L 3.9 3.7   CHLORIDE mmol/L 106 100   CO2 mmol/L 28 29   BUN mg/dL 19 29*   CREATININE mg/dL 0.87 1.20   CALCIUM mg/dL 9.1 9.4   GLUCOSE mg/dL 110* 192*     Magnesium:  Results from last 7 days   Lab Units 05/28/25  1030   MAGNESIUM mg/dL 1.93     Troponin:    Results from last 7 days   Lab Units 05/28/25  1206 05/28/25  1030   TROPHS ng/L 6 5     BNP:   Results from last 7 days   Lab Units 05/28/25  1030   BNP pg/mL 136*     Lipid Panel:         Diagnostic Studies:   @No results found for this or any previous visit.    Results for orders placed in visit on 02/21/20    ECHOCARDIOGRAM    Narrative  Ordered by an unspecified provider.          Radiology:     Transthoracic Echo Complete         XR chest 1 view   Final Result   1.  Mild findings in the right lower hemithorax which are likely   related to the recent lower right rib fracture.                  MACRO:   None        Signed by: Joseph Schoenberger 5/29/2025 8:40 AM   Dictation workstation:   [id number]      CT chest wo IV contrast   Final Result   1. Right-sided rib fractures, as above.   2. Scarring and/or atelectasis at the lung bases bilaterally.   3. No pleural effusion or pneumothorax identified.        MACRO:   None        Signed by: Dave Ortiz 5/28/2025 10:57 AM   Dictation workstation:   [id number]      CT head wo IV contrast   Final Result   No evidence of acute cortical infarct or intracranial hemorrhage.        MACRO:   None             Signed by: Dave Ortiz 5/28/2025 10:47 AM   Dictation workstation:   [id number]      CT cervical spine wo IV contrast   Final Result   1.  No CT evidence of acute fracture.   2.  Degenerative changes throughout the cervical spine, as above.        Signed by: Dave Ortiz 5/28/2025 10:49 AM   Dictation workstation:   [id number]      Holter Or Event Cardiac Monitor    (Results Pending)     Cardiac Cath Transition of Care Summary:  Post  Procedure Diagnosis: Mild CAD.  Blood Loss:               Estimated blood loss during the procedure was 0 mls.  Specimens Removed:        Number of specimen(s) removed: none.     ____________________________________________________________________________________  CONCLUSIONS:   1. Left Main Coronary Artery: This artery is normal.   2. Left Anterior Descending Artery: presents luminal irregularities.   3. Mid LAD Lesion: The percent stenosis is 30%.   4. Circumflex Coronary Artery: presents luminal irregularities.   5. Right Coronary Artery: presents luminal irregularities.   6. The Left Ventricular Ejection Fraction is 55%.     ____________________________________________________________________________________  CPT Codes:  Left Heart Cath Coronary angio w/wo ventriculography (LHC)-08588; Moderate Sedation Services 1st additional 15 minutes patient >5 years-21955     ICD 10 Codes:  I20.9-Angina pectoris, unspecified Class IV     [id number] Arnett MD  Performing Physician  Electronically signed by [id number] Arnett MD on 1/15/2020 at 12:21:28 PM        cc Report to: [id number] Susan Valera     cc Report to: [id number] Nate Arnett MD     Problem List:     Patient Active Problem List   Diagnosis    Hyperlipidemia    Essential hypertension    Diabetes mellitus due to underlying condition, controlled, with microalbuminuria (Multi)    Coronary artery disease involving native coronary artery of native heart without angina pectoris    Benign prostatic hyperplasia    Paroxysmal atrial fibrillation (Multi)    Psoriasis    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    Bedside examination evaluation and interview were performed by me     Chart was reviewed in detail and discussed with the patient staff and family.    Impression:   syncope and collapse  Traumatic fall  Right-sided rib fractures  Mild CAD  Hypertension  Paroxysmal atrial fibrillation anticoagulated on Eliquis  Diabetes mellitus type 2      Plan:   Admit to medicine.  Remains on telemetry.  Telemetry shows rate controlled atrial fibrillation.  Reviewed EKGs which show atrial fibrillation with nonspecific ST and T wave abnormalities.  No STEMI criteria.  Supplemental O2, currently on room air good oxygen saturation  Monitor electrolytes, keep potassium greater than 4 and magnesium greater than 2  Echocardiogram pending  Pain control via primary medicine and trauma surgery team  Does have history of paroxysmal atrial fibrillation and has been on Eliquis for some time.  This is his first known fall/syncopal event.  This is truly a risk versus benefit situation.  I discussed with him about the risk of stroke if he were to stop his anticoagulation but there is also the risk of bleeding if he has continued falls or multiple falls.  He explains this is an isolated event.  His [id number] is 7 which is 11.2% stroke risk per year.  Has bled score is 2 which is a mild to moderate risk of bleeding.  At this current time we would recommend to continue anticoagulation with Eliquis.  Stroke risk is far too high to stop anticoagulation.  I did discuss this with him.  I also explained the importance of safety measures within the house along with ambulation.  He does live for his wife.  If he were to have any more falls we would most likely recommend to stop anticoagulation although that does increase stroke risk significantly.  Unknown cause of syncopal event.  Will put Holter monitor on patient outpatient and evaluate for 7 days to rule out any arrhythmias.  No further general cardiology recommendations at this current  time.  Message sent to schedulers to follow-up with Dr. Arnett after Holter monitor placement  Patient and family instructed to contact us immediately should he have any additional issues or problems.  EP evaluation will be considered in the outpatient setting.    Discussion:  This is a 84-year-old gentleman who apparently after arising went to obtain a glass of water.  He became lightheaded and dizzy when he got to the sink and apparently passed out.  Patient does have a history of atrial fibrillation.  He is on anticoagulation.  He follows in the [facility] office.  He remains in atrial fibrillation.  He is undergoing echo today.  He is at prior evaluation including catheterization 4 to 5 years ago which showed only minimal disease.  He does not have angina.  It is likely the patient had an orthostatic episode possibly from arising from a recumbent and or sleeping situation.  He has been advised to maintain diligent and deliberate change in body positions to avoid these type of episodes.  In the absence of any other significant clinical findings may she may be suitable for discharge today.  We will review his echo.  Outpatient follow-up has been arranged.  Discussion with the family did take place pertaining to the potential of ablation and/or watchman or both.  Will be available to address any other matters should they occur while here.  Follow-up has been determined.      Roe Cedillo,DO,FACC      I have reviewed all medications, laboratory results, and imaging pertinent for today's encounter     Umair Deluna [facility]  Adult Gerontology Acute Care Nurse Practitioner  [facility] [place]   [facility]  [phone]      Of note, this documentation is completed using the Dragon Dictation system (voice recognition software). There may be spelling and/or grammatical errors that were not corrected prior to final submission.      Electronically signed by  Roe Cedillo DO, on 5/29/2025 at 10:21 AM          [1] acetaminophen, 975 mg, oral, q8h  [Held by provider] apixaban, 5 mg, oral, BID  aspirin, 81 mg, oral, Nightly  docusate sodium, 100 mg, oral, BID  finasteride, 5 mg, oral, Daily  hydroxychloroquine, 200 mg, oral, Daily  insulin lispro, 0-5 Units, subcutaneous, TID AC  lidocaine, 1 patch, transdermal, Daily  losartan 100 mg, hydroCHLOROthiazide 25 mg for Hyzaar 100 mg-25 mg, , oral, Daily  metoprolol succinate XL, 25 mg, oral, Daily  oxyBUTYnin, 5 mg, oral, 4x daily  polyethylene glycol, 17 g, oral, Daily  simvastatin, 40 mg, oral, Nightly  tamsulosin, 0.4 mg, oral, Daily     [2]

## 2025-05-29 NOTE — HH CARE COORDINATION
Home Care received a Referral for Nursing, Physical Therapy, and Occupational Therapy. We have processed the referral for a Start of Care on 05-31-25, 24-48 HOURS.     If you have any questions or concerns, please feel free to contact us at [phone]. Follow the prompts, enter your five digit zip code, and you will be directed to your care team on WEST 2.

## 2025-05-29 NOTE — DISCHARGE INSTRUCTIONS
Thank you for choosing [facility]. It has been a pleasure taking part in your medical care. Please follow up with your primary care provider as instructed. If your symptoms should persist or worsen, please contact your primary care physician, or in the case of an emergency proceed to the nearest Emergency Room for further care. If you have any questions about the care you received, please call [facility] at [phone]. Thank you again! AG Weston  *Please note the information above is to be used as a guide, follow up with your PCP for specific information related to your needs *    Clean  your wound to your head with water and pat dry.  Monitor for bleeding or oozing.   Return to ER if bleeding occurs    Recommendations:   It is likely the patient had an orthostatic episode possibly from arising from a recumbent and or sleeping situation. He has been advised to maintain diligent and deliberate change in body positions to avoid these type of episodes. Take time changing positions     Please wear MARTINEZ hose during hte day, monitor when you change positions

## 2025-05-29 NOTE — PROGRESS NOTES
05/29/25 1443   Discharge Planning   Home or Post Acute Services In home services   Type of Home Care Services Home nursing visits;Home OT;Home PT   Expected Discharge Disposition Home H     Per Attending pt medically ready to DC today. Home care orders and healthy at home orders are in [facility]. [facility]  notified of [facility] referral/HCO in [facility]. Demographics verified. Pt PCP is  Ernestina Galeana. Awaiting confirmation of Home care start of care.    Update: [facility] confirms SOC 5/31 or 6/1/25.

## 2025-05-29 NOTE — DISCHARGE SUMMARY
Discharge Diagnosis  Syncope and collapse  Fractures  Contusion head      Issues Requiring Follow-Up  Holter monitor   See follow-up present below  PCP follow-up      Discharge Meds     Medication List      START taking these medications     lidocaine 4 % patch; Place 1 patch over 12 hours on the skin once daily.   Remove & discard patch within 12 hours or as directed by MD.; Start taking   on: May 30, 2025     CHANGE how you take these medications     metFORMIN  mg 24 hr tablet; Commonly known as: Glucophage-XR; Take   1 tablet (500 mg) by mouth once daily.; What changed: when to take this     CONTINUE taking these medications     Accu-Chek Beulah Plus test strp; Generic drug: blood sugar diagnostic   Arthritis Pain Relief (acetam) 650 mg ER tablet; Generic drug:   acetaminophen   aspirin 81 mg chewable tablet   docusate sodium 100 mg capsule; Commonly known as: Colace   Eliquis 5 mg tablet; Generic drug: apixaban; TAKE 1 TABLET BY MOUTH   TWICE DAILY   finasteride 5 mg tablet; Commonly known as: Proscar; Take 1 tablet (5   mg) by mouth once daily.   halobetasol 0.05 % cream; Commonly known as: UltraVATE   hydroxychloroquine 200 mg tablet; Commonly known as: Plaquenil   losartan-hydrochlorothiazide 100-25 mg tablet; Commonly known as:   Hyzaar; TAKE 1 TABLET BY MOUTH DAILY   metoprolol succinate XL 25 mg 24 hr tablet; Commonly known as:   Toprol-XL; TAKE 1 TABLET BY MOUTH ONCE DAILY   simvastatin 40 mg tablet; Commonly known as: Zocor; Take 1 tablet (40   mg) by mouth once daily at bedtime.   tamsulosin 0.4 mg 24 hr capsule; Commonly known as: Flomax; Take 1   capsule (0.4 mg) by mouth once daily.   trospium 60 mg 24 hour capsule; Commonly known as: Sanctura XR; Take 1   capsule (60 mg) by mouth once daily.   Unilet Lancet 33 gauge misc; Generic drug: lancets       Test Results Pending At Discharge  Pending Labs       No current pending labs.            Hospital Course   Artis Horowitz is a 84 y.o. male PMHx  "Diabetes, HTN,  Afib on eliquis, HLD presents  for syncopal and collapse episode.  Patient reports he was out late last night with due to glass of water and the next immediately walked up on the ground.  No precipitating factors such as chest pain, shortness of breath, palpitations headache or blurry vision.  He denies any lightheadedness or dizziness after episode.  He did have a mild headache but was treated with Tylenol.  He is anticoagulated with Eliquis for A-fib.  He believes he hit the table on his fall and that may have what caused his rib fractures. He was found by his wife, as he awakened when he hit the floor and she states he was not there long a \"few minutes\" at most. This has never happened prior. He did take his eliquis this morning. Family stated they were able to get his head to stop bleeding. On exam, on neurological deficits, he is intact.      Patient examined and seen.  He states his pain is under control.  No headache today, no neurological deficits.  He is seen ambulating in room with positive affect.  He states he is ready to go home and voices no immediate concerns.  Daily these taken for pain is Tylenol.  Lidocaine patch to right ribs.  Evaluated no abrasion to head and there is no bruising or bleeding noted.  He was seen by cardiology, recommended to continue Eliquis, education given regarding blood pressure and standing.  [id number] score is 7 which is 11.2% stroke.  Recommend continue Eliquis.  If he has any additional falls then they will discuss further anticoagulation.    Unknown cause of syncopal event.  He will obtain Holter monitor outpatient evaluate for 7 days to rule out any arrhythmias.  He is not to drive during this time.  No IV lifting.  Follow-up with Dr. Arnett arranged by schedulers and Holter monitor placement.  Patient remains chest pain-free, voices no immediate concerns.    He would like to go home today.  Echo shows LVEF 60 to 65% with no abnormalities.  Bubble " study negative.  Continue pulmonary toileting.  Orthostatic vital signs negative    Trauma reevaluated patient.  No acute needs or interventions at this time.    Patient discharged home in stable condition.  Rx sent to his pharmacy   Tylenol for pain    Time spent  40  minutes obtaining labs, imaging, recommendations, interview, assessment, examination, medication review/ordering, and EMR review.    Plan of care was discussed extensively with patient. Patient verbalized understanding through teach back method. All questions and concerns addressed upon examination.     Of note, this documentation is completed using the Dragon Dictation system (voice recognition software). There may be spelling and/or grammatical errors that were not corrected prior to final submission.      Pertinent Physical Exam At Time of Discharge  Physical Exam  Constitutional: Well developed, awake/alert/oriented x3, , cooperative  Eyes: PERRL, EOMI, clear sclera  ENMT: mucous membranes moist, no apparent injury, no lesions seen, tender with palpation to occiput area, scant drainage   Head/Neck: Neck supple, no apparent injury, thyroid without mass or tenderness  Respiratory/Thorax: Patent airways,  normal breath sounds with good  Cardiovascular: irregular, rate and rhythm,  2+ equal pulses of the extremities, normal S 1and S 2, mild edema in bilateral lower extremities   Gastrointestinal: Nondistended, soft, non-tender,  +BS x 4 quadrants  Genitourinary: voiding freely without CVA tenderness or suprabupic tenderness   Musculoskeletal: ROM intact, no joint swelling, thoracic chest wall tenderness due to rib fx with palpation   Extremities: normal extremities,  no contusions or wounds seen   Skin: warm, dry, intact  Neurological: alert/oriented x 3, speech clear, no neurological deficits identified   Psychiatric: appropriate mood and behavior  Outpatient Follow-Up  Future Appointments   Date Time Provider Department Center   6/5/2025  1:45 PM  ELY [id number] ECG/HOLTER [id number] [facility]   6/26/2025  8:00 AM Nate Arnett MD [id number] [facility]   7/8/2025 12:45 PM Nate Arnett MD [id number] [facility]   8/20/2025 10:30 AM Nicolette Galeana DO [id number] [facility]   8/25/2025 10:30 AM Demetrio Khan MD [address]         Trista Block, APRN-CNP

## 2025-05-29 NOTE — CARE PLAN
The patient's goals for the shift include      The clinical goals for the shift include patient will remain free of falls throughout shift    Over the shift, the patient did not make progress toward the following goals. Barriers to progression include . Recommendations to address these barriers include .

## 2025-05-30 ENCOUNTER — PATIENT OUTREACH (OUTPATIENT)
Dept: PRIMARY CARE | Facility: CLINIC | Age: 85
End: 2025-05-30
Payer: MEDICARE

## 2025-05-30 NOTE — PROGRESS NOTES
"Discharge Facility: [facility]  Discharge Diagnosis  Syncope and collapse  Fractures  Contusion head    Admission Date: 5/28/2025  Discharge Date: 5/29/2025    PCP Appointment Date: none-task  Specialist Appointment Date:    -cardiology 6/5/2025, 6/26/2025, 7/8/2025  -urology 8/25/2025    Hospital Encounter and Summary Linked: Yes  ED to Hosp-Admission (Discharged) with Scott Portillo DO; Roe Garcia MD (05/28/2025)   See discharge assessment below for further details    Hospital Course   Artis Horowitz is a 84 y.o. male PMHx Diabetes, HTN,  Afib on eliquis, HLD presents  for syncopal and collapse episode.  Patient reports he was out late last night with due to glass of water and the next immediately walked up on the ground.  No precipitating factors such as chest pain, shortness of breath, palpitations headache or blurry vision.  He denies any lightheadedness or dizziness after episode.  He did have a mild headache but was treated with Tylenol.  He is anticoagulated with Eliquis for A-fib.  He believes he hit the table on his fall and that may have what caused his rib fractures. He was found by his wife, as he awakened when he hit the floor and she states he was not there long a \"few minutes\" at most. This has never happened prior. He did take his eliquis this morning. Family stated they were able to get his head to stop bleeding. On exam, on neurological deficits, he is intact.       Patient examined and seen.  He states his pain is under control.  No headache today, no neurological deficits.  He is seen ambulating in room with positive affect.  He states he is ready to go home and voices no immediate concerns.  Daily these taken for pain is Tylenol.  Lidocaine patch to right ribs.  Evaluated no abrasion to head and there is no bruising or bleeding noted.  He was seen by cardiology, recommended to continue Eliquis, education given regarding blood pressure and standing.  [id number] score is 7 which is 11.2% " stroke.  Recommend continue Eliquis.  If he has any additional falls then they will discuss further anticoagulation.     Unknown cause of syncopal event.  He will obtain Holter monitor outpatient evaluate for 7 days to rule out any arrhythmias.  He is not to drive during this time.  No IV lifting.  Follow-up with Dr. Arnett arranged by schedulers and Holter monitor placement.  Patient remains chest pain-free, voices no immediate concerns.     He would like to go home today.  Echo shows LVEF 60 to 65% with no abnormalities.  Bubble study negative.  Continue pulmonary toileting.  Orthostatic vital signs negative    Wrap Up  Wrap Up Additional Comments: CTS spoke with patient. He was admitted to [facility] on 5/28/2025 with syncope and collapse. Discharged home on 5/29/2025 with  home care. PAtient stated that he is still sore from the ribs. Denies any chest pains or shortness of breath. No dizziness. Patient is using a cane to ambulate. Reviewed medications with patient. Understands discharge instructions. Patient does need an appt with PCP. Patient declined making an appt at this time.  home care will be starting care on Monday 6/2/2025. Will get a holter monitor on Thursday 6/5/2025.Patient voiced no questions or concerns at this time. Patient has my contact information for non- emergent issues that may arise. (5/30/2025  9:36 AM)  Call End Time: 0940 (5/30/2025  9:36 AM)    Engagement  Call Start Time: 0936 (5/30/2025  9:36 AM)    Medications  Medications reviewed with patient/caregiver?: Yes (5/30/2025  9:36 AM)  Is the patient having any side effects they believe may be caused by any medication additions or changes?: No (5/30/2025  9:36 AM)  Does the patient have all medications ordered at discharge?: Yes (5/30/2025  9:36 AM)  Care Management Interventions: No intervention needed (5/30/2025  9:36 AM)  Prescription Comments: new: lidocaine patch (5/30/2025  9:36 AM)  Is the patient taking all medications as  directed (includes completed medication regime)?: Yes (5/30/2025  9:36 AM)  Medication Comments: see med list (5/30/2025  9:36 AM)    Appointments  Does the patient have a primary care provider?: Yes (5/30/2025  9:36 AM)  Care Management Interventions: Advised patient to make appointment (5/30/2025  9:36 AM)  Has the patient kept scheduled appointments due by today?: Yes (5/30/2025  9:36 AM)  Care Management Interventions: Advised patient to keep appointment (5/30/2025  9:36 AM)    Self Management  What is the home health agency?:  home care (5/30/2025  9:36 AM)  Has home health visited the patient within 72 hours of discharge?: Call prior to 72 hours (5/30/2025  9:36 AM)  What Durable Medical Equipment (DME) was ordered?: n/a (5/30/2025  9:36 AM)    Patient Teaching  Does the patient have access to their discharge instructions?: Yes (5/30/2025  9:36 AM)  Care Management Interventions: Reviewed instructions with patient (5/30/2025  9:36 AM)  What is the patient's perception of their health status since discharge?: Improving (5/30/2025  9:36 AM)  Is the patient/caregiver able to teach back the hierarchy of who to call/visit for symptoms/problems? PCP, Specialist, Home Health nurse, Urgent Care, ED, 911: Yes (5/30/2025  9:36 AM)  Patient/Caregiver Education Comments: see wrap up (5/30/2025  9:36 AM)

## 2025-05-31 ENCOUNTER — PATIENT OUTREACH (OUTPATIENT)
Dept: CARE COORDINATION | Age: 85
End: 2025-05-31
Payer: MEDICARE

## 2025-06-02 ENCOUNTER — TELEPHONE (OUTPATIENT)
Dept: HOME HEALTH SERVICES | Facility: HOME HEALTH | Age: 85
End: 2025-06-02
Payer: MEDICARE

## 2025-06-02 NOTE — TELEPHONE ENCOUNTER
FYI patient was referred to [facility] for nursing and therapy upon hospital discharge however declined services when contacted for scheduling. Please ensure patient has a hospital follow up scheduled as they will not be monitored by home care.

## 2025-06-05 ENCOUNTER — APPOINTMENT (OUTPATIENT)
Dept: CARDIOLOGY | Facility: CLINIC | Age: 85
End: 2025-06-05
Payer: MEDICARE

## 2025-06-05 DIAGNOSIS — R55 SYNCOPE AND COLLAPSE: ICD-10-CM

## 2025-06-06 ENCOUNTER — OFFICE VISIT (OUTPATIENT)
Dept: PRIMARY CARE | Facility: CLINIC | Age: 85
End: 2025-06-06
Payer: MEDICARE

## 2025-06-06 VITALS
SYSTOLIC BLOOD PRESSURE: 124 MMHG | TEMPERATURE: 97.6 F | WEIGHT: 174 LBS | HEART RATE: 68 BPM | DIASTOLIC BLOOD PRESSURE: 70 MMHG | HEIGHT: 68 IN | OXYGEN SATURATION: 98 % | BODY MASS INDEX: 26.37 KG/M2 | RESPIRATION RATE: 16 BRPM

## 2025-06-06 DIAGNOSIS — R55 SYNCOPE AND COLLAPSE: Primary | ICD-10-CM

## 2025-06-06 DIAGNOSIS — G44.319 ACUTE POST-TRAUMATIC HEADACHE, NOT INTRACTABLE: ICD-10-CM

## 2025-06-06 DIAGNOSIS — I25.10 CORONARY ARTERY DISEASE INVOLVING NATIVE CORONARY ARTERY OF NATIVE HEART WITHOUT ANGINA PECTORIS: ICD-10-CM

## 2025-06-06 DIAGNOSIS — S22.49XD CLOSED FRACTURE OF MULTIPLE RIBS WITH ROUTINE HEALING, UNSPECIFIED LATERALITY, SUBSEQUENT ENCOUNTER: ICD-10-CM

## 2025-06-06 DIAGNOSIS — R26.81 GENERALLY UNSTEADY: ICD-10-CM

## 2025-06-06 DIAGNOSIS — S09.90XD CLOSED HEAD INJURY, SUBSEQUENT ENCOUNTER: ICD-10-CM

## 2025-06-06 PROCEDURE — 3078F DIAST BP <80 MM HG: CPT | Performed by: INTERNAL MEDICINE

## 2025-06-06 PROCEDURE — 3074F SYST BP LT 130 MM HG: CPT | Performed by: INTERNAL MEDICINE

## 2025-06-06 PROCEDURE — 99495 TRANSJ CARE MGMT MOD F2F 14D: CPT | Performed by: INTERNAL MEDICINE

## 2025-06-06 PROCEDURE — 1125F AMNT PAIN NOTED PAIN PRSNT: CPT | Performed by: INTERNAL MEDICINE

## 2025-06-06 PROCEDURE — 1160F RVW MEDS BY RX/DR IN RCRD: CPT | Performed by: INTERNAL MEDICINE

## 2025-06-06 PROCEDURE — 1159F MED LIST DOCD IN RCRD: CPT | Performed by: INTERNAL MEDICINE

## 2025-06-06 PROCEDURE — 1036F TOBACCO NON-USER: CPT | Performed by: INTERNAL MEDICINE

## 2025-06-06 RX ORDER — BISMUTH SUBSALICYLATE 262 MG
1 TABLET,CHEWABLE ORAL DAILY
COMMUNITY

## 2025-06-06 ASSESSMENT — ENCOUNTER SYMPTOMS
COUGH: 0
FATIGUE: 0
SHORTNESS OF BREATH: 0
FEVER: 0

## 2025-06-06 ASSESSMENT — PAIN SCALES - GENERAL: PAINLEVEL_OUTOF10: 5

## 2025-06-06 NOTE — PROGRESS NOTES
"Subjective   Artis Horowitz is a 84 y.o. male who presents for Hospital Follow-up.    [facility] 5/28/25 - 5/29/25  Dx: Syncope and collapse, Fractures, Contusion head  Labs, CT scans, CXR, EKG, TTE, Holter  Med changes: Lidocaine patches   Follow up: PCP, Cardiology 6/25.  Denies further syncopal episodes.  C/o occasionally R side headaches 1-2x/day.  Tylenol effective.  \"A little wobbly\" when up and about.  Rib pain improving.  Tylenol, Lidocaine patches effective.    Review of Systems   Constitutional:  Negative for fatigue and fever.   Respiratory:  Negative for cough and shortness of breath.    Cardiovascular:  Negative for chest pain and leg swelling.   All other systems reviewed and are negative.      Health Maintenance Due   Topic Date Due    DTaP/Tdap/Td Vaccines (1 - Tdap) Never done    Pneumococcal Vaccine (2 of 2 - PCV) 04/14/2023    Diabetes: Retinopathy Screening  07/12/2023    COVID-19 Vaccine (5 - 2024-25 season) 05/22/2025    Medicare Annual Wellness Visit (AWV)  06/20/2025    Diabetes: Hemoglobin A1C  07/28/2025       Objective   /70   Pulse 68   Temp 36.4 °C (97.6 °F)   Resp 16   Ht 1.727 m (5' 8\")   Wt 78.9 kg (174 lb)   SpO2 98%   BMI 26.46 kg/m²     Physical Exam  Vitals and nursing note reviewed.   Constitutional:       Appearance: Normal appearance.   HENT:      Head: Normocephalic.   Eyes:      Conjunctiva/sclera: Conjunctivae normal.      Pupils: Pupils are equal, round, and reactive to light.   Cardiovascular:      Rate and Rhythm: Normal rate and regular rhythm.      Pulses: Normal pulses.      Heart sounds: Normal heart sounds.   Pulmonary:      Effort: Pulmonary effort is normal.      Breath sounds: Normal breath sounds.   Musculoskeletal:         General: No swelling.      Cervical back: Neck supple.   Skin:     General: Skin is warm and dry.   Neurological:      General: No focal deficit present.      Mental Status: He is oriented to person, place, and time. "         Assessment/Plan   Problem List Items Addressed This Visit       Coronary artery disease involving native coronary artery of native heart without angina pectoris    Syncope and collapse - Primary     Other Visit Diagnoses         Closed fracture of multiple ribs with routine healing, unspecified laterality, subsequent encounter          Closed head injury, subsequent encounter          Generally unsteady        Relevant Orders    Referral to Physical Therapy      Acute post-traumatic headache, not intractable              Reivewed records  Cont tylenol  Discussed concussion mgmt  Incentive spirometer  Pt   Fu with cardiology  Stable based on symptoms and exam.  Continue established treatment plan and follow up at least yearly.

## 2025-06-12 ENCOUNTER — PATIENT OUTREACH (OUTPATIENT)
Dept: PRIMARY CARE | Facility: CLINIC | Age: 85
End: 2025-06-12
Payer: MEDICARE

## 2025-06-12 NOTE — PROGRESS NOTES
Confirmation of at least 2 patient identifiers.    Completed telephonic follow-up with patient after recent visit with Dr Galeana    Spoke to Spouse/significant other during outreach call.    Patient reports feeling: Improved    Patient has questions or concerns about medications: No    Have all prescribed medications been filled? Yes    Patient has necessary resources to manage their care? Yes    Patient has questions or concerns? No    Wife stated that he was coming along slowly. He was out of the house today to return the heart monitor.     Next care management follow-up approximately within one month.  Care  information provided to patient.

## 2025-06-20 PROCEDURE — 93248 EXT ECG>7D<15D REV&INTERPJ: CPT | Performed by: INTERNAL MEDICINE

## 2025-06-26 ENCOUNTER — APPOINTMENT (OUTPATIENT)
Dept: CARDIOLOGY | Facility: CLINIC | Age: 85
End: 2025-06-26
Payer: MEDICARE

## 2025-06-26 VITALS
WEIGHT: 173.4 LBS | HEIGHT: 67 IN | SYSTOLIC BLOOD PRESSURE: 118 MMHG | DIASTOLIC BLOOD PRESSURE: 72 MMHG | BODY MASS INDEX: 27.21 KG/M2 | HEART RATE: 72 BPM

## 2025-06-26 DIAGNOSIS — I48.0 PAROXYSMAL ATRIAL FIBRILLATION (MULTI): ICD-10-CM

## 2025-06-26 DIAGNOSIS — I10 ESSENTIAL HYPERTENSION: ICD-10-CM

## 2025-06-26 DIAGNOSIS — R80.9: ICD-10-CM

## 2025-06-26 DIAGNOSIS — R60.0 LEG EDEMA: ICD-10-CM

## 2025-06-26 DIAGNOSIS — I25.10 CORONARY ARTERY DISEASE INVOLVING NATIVE CORONARY ARTERY OF NATIVE HEART WITHOUT ANGINA PECTORIS: ICD-10-CM

## 2025-06-26 DIAGNOSIS — E08.29: ICD-10-CM

## 2025-06-26 DIAGNOSIS — R55 SYNCOPE AND COLLAPSE: ICD-10-CM

## 2025-06-26 DIAGNOSIS — E78.2 MIXED HYPERLIPIDEMIA: ICD-10-CM

## 2025-06-26 DIAGNOSIS — Z78.9 NEVER SMOKED TOBACCO: ICD-10-CM

## 2025-06-26 DIAGNOSIS — I73.9 PVD (PERIPHERAL VASCULAR DISEASE): ICD-10-CM

## 2025-06-26 PROCEDURE — 3074F SYST BP LT 130 MM HG: CPT | Performed by: INTERNAL MEDICINE

## 2025-06-26 PROCEDURE — 1036F TOBACCO NON-USER: CPT | Performed by: INTERNAL MEDICINE

## 2025-06-26 PROCEDURE — 3078F DIAST BP <80 MM HG: CPT | Performed by: INTERNAL MEDICINE

## 2025-06-26 PROCEDURE — 1159F MED LIST DOCD IN RCRD: CPT | Performed by: INTERNAL MEDICINE

## 2025-06-26 PROCEDURE — 99214 OFFICE O/P EST MOD 30 MIN: CPT | Performed by: INTERNAL MEDICINE

## 2025-06-26 RX ORDER — FUROSEMIDE 20 MG/1
TABLET ORAL
Qty: 90 TABLET | Refills: 3 | Status: SHIPPED | OUTPATIENT
Start: 2025-06-26

## 2025-06-26 NOTE — PROGRESS NOTES
Referred by Dr. Oneal ref. provider found provider found for   Chief Complaint   Patient presents with    Follow-up     1 year follow up of ongoing management of Coronary artery disease involving native coronary artery of native heart without angina pectoris    Essential hypertension    Elevated lipoprotein(a)    Paroxysmal atrial fibrillation (Multi)          Chief complaint:   Chief Complaint   Patient presents with    Follow-up     1 year follow up of ongoing management of Coronary artery disease involving native coronary artery of native heart without angina pectoris    Essential hypertension    Elevated lipoprotein(a)    Paroxysmal atrial fibrillation (Multi)          History of Present Illness  Artis Horowitz is a 84 y.o. year old male patient for follow-up.  Had the chronic atrial fibrillation had fallen at broke his rib.  Was not sure whether he had syncope or not however his Holter monitor showed atrial fibrillation but he had short nonsustained VT.  Has not had any further symptoms since the fall.  He broke his ribs.  Discussed with the patient that we need to rule out ischemia as etiology of his ventricular arrhythmia.  Will obtain Lexiscan stress test.  Also referred to possible watchman in the future.  Will follow-up as scheduled                              Shared Decision Tool - Referral for Left Atrial Appendage Occlusion    My patient Artis Horowitz 1940 has been evaluated by me and is referred to [facility] for a left atrial appendage implant due thromboembolic stroke risk from atrial fibrillation with CHADS2 score >= 2 or a [id number] score >= 3.    This patient is not a good candidate for long term anticoagulation for the following reason(s):    This patient however should be able to tolerate short term anticoagulation as necessary for LAAO device implant.  My Patient and I, the referring physician, have reviewed the following:  Yes  Atrial Fibrillation increases the risk of  stroke.  Yes Anticoagulants or blood thinners help reduce the risk of stroke for most people.  Yes    Blood thinners may cause minor or serious bleeding issues.  Yes  Blood thinners include the medications aspirin, warfarin, and NOAC (dabigatran, edoxaban, rivaroxaban, apixaban...), each with unique risk and safety profiles.  Yes We reviewed his/her anticoagulation history and previous experiences.  Yes We discussed lack of other alternative treatments available to prevent stroke risk.  Yes We discussed the LAAO device implant vs taking anticoagulation to reduce stroke risk.  Yes We referred the patient to a LAAO outpatient clinic for further explanation and consideration.          Yes The LAAO device has been adequately explained along with the risks and benefits of treatment. Alternative treatment has been discussed with all questions answered. After discussion of the above considerations, it has been decided to be evaluated for the LAAO therapies.    Reviewed and approved by AASHISH PATEL on 6/26/25 at 9:02 AM.       Past Medical History  Medical History[1]    Social History  Social History[2]    Family History   Family History[3]    Review of Systems  As per HPI, all other systems reviewed and negative.    Allergies:  RX Allergies[4]     Outpatient Medications:  Current Outpatient Medications   Medication Instructions    Accu-Chek Beulah Plus test strp strip USE ONE STRIP TO CHECK GLUCOSE ONCE DAILY    acetaminophen (ARTHRITIS PAIN RELIEF (ACETAM)) 1,300 mg, Every 8 hours PRN    aspirin 81 mg, Nightly    cholecalciferol, vitamin D3, (VITAMIN D3 ORAL) Take 1 tab po every day.    docusate sodium (Colace) 100 mg capsule 1 capsule, 2 times daily PRN    Eliquis 5 mg, oral, 2 times daily    finasteride (PROSCAR) 5 mg, oral, Daily    halobetasol (UltraVATE) 0.05 % cream to affected area    hydroxychloroquine (Plaquenil) 200 mg tablet Daily    lidocaine 4 % patch 1 patch, transdermal, Daily, Remove & discard patch  within 12 hours or as directed by MD.    losartan-hydrochlorothiazide (Hyzaar) 100-25 mg tablet 1 tablet, oral, Daily    metFORMIN XR (GLUCOPHAGE-XR) 500 mg, oral, Daily    metoprolol succinate XL (TOPROL-XL) 25 mg, oral, Daily    multivitamin tablet 1 tablet, Daily    NON FORMULARY Prevagen- Take 1 tab po every day.    simvastatin (ZOCOR) 40 mg, oral, Nightly    tamsulosin (FLOMAX) 0.4 mg, oral, Daily    trospium (SANCTURA XR) 60 mg, oral, Daily    Unilet Lancet 33 gauge misc USE AS DIRECTED to check blood sugar ONCE DAILY         Vitals:  Vitals:    06/26/25 0808   BP: 118/72   Pulse: 72       Physical Exam:  Physical Exam  Vitals and nursing note reviewed.   Constitutional:       Appearance: Normal appearance.   HENT:      Head: Normocephalic and atraumatic.   Eyes:      Extraocular Movements: Extraocular movements intact.      Pupils: Pupils are equal, round, and reactive to light.   Cardiovascular:      Rate and Rhythm: Normal rate and regular rhythm.      Pulses: Normal pulses.   Pulmonary:      Effort: Pulmonary effort is normal.      Breath sounds: Normal breath sounds.   Musculoskeletal:         General: Normal range of motion.      Cervical back: Normal range of motion.      Right lower leg: No edema.      Left lower leg: No edema.   Skin:     General: Skin is warm and dry.   Neurological:      General: No focal deficit present.      Mental Status: He is alert and oriented to person, place, and time.             Assessment/Plan   Diagnoses and all orders for this visit:  Coronary artery disease involving native coronary artery of native heart without angina pectoris  Syncope and collapse  Paroxysmal atrial fibrillation (Multi)  Leg edema  Essential hypertension  Mixed hyperlipidemia  PVD (peripheral vascular disease)  Diabetes mellitus due to underlying condition, controlled, with microalbuminuria, without long-term current use of insulin (Multi)  BMI 27.0-27.9,adult  Never smoked tobacco      Domenica TOM  Given, LPN am scribing for, and in the presence of Nate Arnett M.D..    I, Nate Arnett M.D., personally performed the services described in the documentation as scribed by Domenica Pond LPN in my presence, and confirm it is both accurate and complete.      Nate Arnett MD [facility]  Interventional Cardiology   of [facility]     Thank you for allowing me to participate in the care of this patient. Please do not hesitate to contact me with any further questions or concerns.         [1]   Past Medical History:  Diagnosis Date    Benign prostatic hyperplasia with lower urinary tract symptoms     Enlarged prostate with lower urinary tract symptoms (LUTS)    Diabetes mellitus (Multi)     Encounter for general adult medical examination without abnormal findings     Health care maintenance    Heart disease     Hypertension     Other bursitis of hip, unspecified hip     Bursitis of hip    Other conditions influencing health status     History of cough    Other fecal abnormalities 05/14/2021    Occult blood in stools    Other specified abnormal findings of blood chemistry     Creatinine elevation    Pain in left leg     Pain of left lower extremity    Personal history of other diseases of the circulatory system     History of coronary artery disease    Personal history of other diseases of the circulatory system     History of abnormal electrocardiography    Personal history of other diseases of the circulatory system 07/29/2019    History of hypertension    Personal history of other diseases of the digestive system     History of constipation    Personal history of other diseases of the musculoskeletal system and connective tissue     History of spinal stenosis    Personal history of other diseases of the musculoskeletal system and connective tissue     History of rheumatoid arthritis    Personal history of other diseases of the musculoskeletal system and connective tissue     History of backache     Personal history of other diseases of the musculoskeletal system and connective tissue     History of muscle spasm    Personal history of other diseases of the nervous system and sense organs     History of tinnitus    Personal history of other diseases of the nervous system and sense organs     History of hearing loss    Personal history of other diseases of the respiratory system     History of acute bronchitis    Personal history of other endocrine, nutritional and metabolic disease     History of hyperlipidemia    Personal history of other endocrine, nutritional and metabolic disease 09/01/2020    History of obesity    Personal history of other endocrine, nutritional and metabolic disease 03/27/2020    History of diabetes mellitus    Personal history of other infectious and parasitic diseases     History of candidiasis    Personal history of other specified conditions     History of nasal congestion    Stress incontinence (female) (male)     Male stress incontinence   [2]   Social History  Tobacco Use    Smoking status: Never    Smokeless tobacco: Never   Vaping Use    Vaping status: Never Used   Substance Use Topics    Alcohol use: Yes     Comment: 2x a week    Drug use: Not Currently   [3]   Family History  Problem Relation Name Age of Onset    Heart disease Mother kerrie rodriguez     Arthritis Mother kerrie rodriguez    [4] No Known Allergies

## 2025-06-26 NOTE — H&P (VIEW-ONLY)
Referred by Dr. Oneal ref. provider found provider found for   Chief Complaint   Patient presents with    Follow-up     1 year follow up of ongoing management of Coronary artery disease involving native coronary artery of native heart without angina pectoris    Essential hypertension    Elevated lipoprotein(a)    Paroxysmal atrial fibrillation (Multi)          Chief complaint:   Chief Complaint   Patient presents with    Follow-up     1 year follow up of ongoing management of Coronary artery disease involving native coronary artery of native heart without angina pectoris    Essential hypertension    Elevated lipoprotein(a)    Paroxysmal atrial fibrillation (Multi)          History of Present Illness  Artis Horowitz is a 84 y.o. year old male patient for follow-up.  Had the chronic atrial fibrillation had fallen at broke his rib.  Was not sure whether he had syncope or not however his Holter monitor showed atrial fibrillation but he had short nonsustained VT.  Has not had any further symptoms since the fall.  He broke his ribs.  Discussed with the patient that we need to rule out ischemia as etiology of his ventricular arrhythmia.  Will obtain Lexiscan stress test.  Also referred to possible watchman in the future.  Will follow-up as scheduled                              Shared Decision Tool - Referral for Left Atrial Appendage Occlusion    My patient Artis Horowitz 1940 has been evaluated by me and is referred to [facility] for a left atrial appendage implant due thromboembolic stroke risk from atrial fibrillation with CHADS2 score >= 2 or a [id number] score >= 3.    This patient is not a good candidate for long term anticoagulation for the following reason(s):    This patient however should be able to tolerate short term anticoagulation as necessary for LAAO device implant.  My Patient and I, the referring physician, have reviewed the following:  Yes  Atrial Fibrillation increases the risk of  stroke.  Yes Anticoagulants or blood thinners help reduce the risk of stroke for most people.  Yes    Blood thinners may cause minor or serious bleeding issues.  Yes  Blood thinners include the medications aspirin, warfarin, and NOAC (dabigatran, edoxaban, rivaroxaban, apixaban...), each with unique risk and safety profiles.  Yes We reviewed his/her anticoagulation history and previous experiences.  Yes We discussed lack of other alternative treatments available to prevent stroke risk.  Yes We discussed the LAAO device implant vs taking anticoagulation to reduce stroke risk.  Yes We referred the patient to a LAAO outpatient clinic for further explanation and consideration.          Yes The LAAO device has been adequately explained along with the risks and benefits of treatment. Alternative treatment has been discussed with all questions answered. After discussion of the above considerations, it has been decided to be evaluated for the LAAO therapies.    Reviewed and approved by AASHISH PATEL on 6/26/25 at 9:02 AM.       Past Medical History  Medical History[1]    Social History  Social History[2]    Family History   Family History[3]    Review of Systems  As per HPI, all other systems reviewed and negative.    Allergies:  RX Allergies[4]     Outpatient Medications:  Current Outpatient Medications   Medication Instructions    Accu-Chek Beulah Plus test strp strip USE ONE STRIP TO CHECK GLUCOSE ONCE DAILY    acetaminophen (ARTHRITIS PAIN RELIEF (ACETAM)) 1,300 mg, Every 8 hours PRN    aspirin 81 mg, Nightly    cholecalciferol, vitamin D3, (VITAMIN D3 ORAL) Take 1 tab po every day.    docusate sodium (Colace) 100 mg capsule 1 capsule, 2 times daily PRN    Eliquis 5 mg, oral, 2 times daily    finasteride (PROSCAR) 5 mg, oral, Daily    halobetasol (UltraVATE) 0.05 % cream to affected area    hydroxychloroquine (Plaquenil) 200 mg tablet Daily    lidocaine 4 % patch 1 patch, transdermal, Daily, Remove & discard patch  within 12 hours or as directed by MD.    losartan-hydrochlorothiazide (Hyzaar) 100-25 mg tablet 1 tablet, oral, Daily    metFORMIN XR (GLUCOPHAGE-XR) 500 mg, oral, Daily    metoprolol succinate XL (TOPROL-XL) 25 mg, oral, Daily    multivitamin tablet 1 tablet, Daily    NON FORMULARY Prevagen- Take 1 tab po every day.    simvastatin (ZOCOR) 40 mg, oral, Nightly    tamsulosin (FLOMAX) 0.4 mg, oral, Daily    trospium (SANCTURA XR) 60 mg, oral, Daily    Unilet Lancet 33 gauge misc USE AS DIRECTED to check blood sugar ONCE DAILY         Vitals:  Vitals:    06/26/25 0808   BP: 118/72   Pulse: 72       Physical Exam:  Physical Exam  Vitals and nursing note reviewed.   Constitutional:       Appearance: Normal appearance.   HENT:      Head: Normocephalic and atraumatic.   Eyes:      Extraocular Movements: Extraocular movements intact.      Pupils: Pupils are equal, round, and reactive to light.   Cardiovascular:      Rate and Rhythm: Normal rate and regular rhythm.      Pulses: Normal pulses.   Pulmonary:      Effort: Pulmonary effort is normal.      Breath sounds: Normal breath sounds.   Musculoskeletal:         General: Normal range of motion.      Cervical back: Normal range of motion.      Right lower leg: No edema.      Left lower leg: No edema.   Skin:     General: Skin is warm and dry.   Neurological:      General: No focal deficit present.      Mental Status: He is alert and oriented to person, place, and time.             Assessment/Plan   Diagnoses and all orders for this visit:  Coronary artery disease involving native coronary artery of native heart without angina pectoris  Syncope and collapse  Paroxysmal atrial fibrillation (Multi)  Leg edema  Essential hypertension  Mixed hyperlipidemia  PVD (peripheral vascular disease)  Diabetes mellitus due to underlying condition, controlled, with microalbuminuria, without long-term current use of insulin (Multi)  BMI 27.0-27.9,adult  Never smoked tobacco      Domenica TOM  Given, LPN am scribing for, and in the presence of Nate Arnett M.D..    I, Nate Arnett M.D., personally performed the services described in the documentation as scribed by Domenica Pond LPN in my presence, and confirm it is both accurate and complete.      Nate Arnett MD [facility]  Interventional Cardiology   of [facility]     Thank you for allowing me to participate in the care of this patient. Please do not hesitate to contact me with any further questions or concerns.         [1]   Past Medical History:  Diagnosis Date    Benign prostatic hyperplasia with lower urinary tract symptoms     Enlarged prostate with lower urinary tract symptoms (LUTS)    Diabetes mellitus (Multi)     Encounter for general adult medical examination without abnormal findings     Health care maintenance    Heart disease     Hypertension     Other bursitis of hip, unspecified hip     Bursitis of hip    Other conditions influencing health status     History of cough    Other fecal abnormalities 05/14/2021    Occult blood in stools    Other specified abnormal findings of blood chemistry     Creatinine elevation    Pain in left leg     Pain of left lower extremity    Personal history of other diseases of the circulatory system     History of coronary artery disease    Personal history of other diseases of the circulatory system     History of abnormal electrocardiography    Personal history of other diseases of the circulatory system 07/29/2019    History of hypertension    Personal history of other diseases of the digestive system     History of constipation    Personal history of other diseases of the musculoskeletal system and connective tissue     History of spinal stenosis    Personal history of other diseases of the musculoskeletal system and connective tissue     History of rheumatoid arthritis    Personal history of other diseases of the musculoskeletal system and connective tissue     History of backache     Personal history of other diseases of the musculoskeletal system and connective tissue     History of muscle spasm    Personal history of other diseases of the nervous system and sense organs     History of tinnitus    Personal history of other diseases of the nervous system and sense organs     History of hearing loss    Personal history of other diseases of the respiratory system     History of acute bronchitis    Personal history of other endocrine, nutritional and metabolic disease     History of hyperlipidemia    Personal history of other endocrine, nutritional and metabolic disease 09/01/2020    History of obesity    Personal history of other endocrine, nutritional and metabolic disease 03/27/2020    History of diabetes mellitus    Personal history of other infectious and parasitic diseases     History of candidiasis    Personal history of other specified conditions     History of nasal congestion    Stress incontinence (female) (male)     Male stress incontinence   [2]   Social History  Tobacco Use    Smoking status: Never    Smokeless tobacco: Never   Vaping Use    Vaping status: Never Used   Substance Use Topics    Alcohol use: Yes     Comment: 2x a week    Drug use: Not Currently   [3]   Family History  Problem Relation Name Age of Onset    Heart disease Mother kerrie rodriguez     Arthritis Mother kerrie rodriguez    [4] No Known Allergies

## 2025-06-26 NOTE — PATIENT INSTRUCTIONS
START lasix 20 mg once a day as needed for leg edema  You are being scheduled for a Lexiscan stress test  You are being referred to Dr. Carlos to discuss Watchman  Patient to follow up after testing.  Continue same medications/treatment.  Patient educated on proper medication use.  Patient educated on risk factor modification.  Please bring any lab results from other providers / physicians to your next appointment.    Please bring all medicines, vitamins and herbal supplements with you when you come to the office.    Prescriptions will not be filled unless you are compliant with your follow up appointments or have a follow up  appointment scheduled as per instruction of your physician.  Refills should be requested at the time of  Your visit.

## 2025-07-07 ENCOUNTER — HOSPITAL ENCOUNTER (OUTPATIENT)
Dept: CARDIOLOGY | Facility: CLINIC | Age: 85
Discharge: HOME | End: 2025-07-07
Payer: MEDICARE

## 2025-07-07 ENCOUNTER — HOSPITAL ENCOUNTER (OUTPATIENT)
Dept: RADIOLOGY | Facility: CLINIC | Age: 85
Discharge: HOME | End: 2025-07-07
Payer: MEDICARE

## 2025-07-07 DIAGNOSIS — I25.10 CORONARY ARTERY DISEASE INVOLVING NATIVE CORONARY ARTERY OF NATIVE HEART WITHOUT ANGINA PECTORIS: ICD-10-CM

## 2025-07-07 DIAGNOSIS — N32.89 BLADDER INSTABILITY: ICD-10-CM

## 2025-07-07 DIAGNOSIS — I48.0 PAROXYSMAL ATRIAL FIBRILLATION (MULTI): ICD-10-CM

## 2025-07-07 DIAGNOSIS — I25.10 CORONARY ARTERY DISEASE INVOLVING NATIVE CORONARY ARTERY OF NATIVE HEART WITHOUT ANGINA PECTORIS: Primary | ICD-10-CM

## 2025-07-07 DIAGNOSIS — R55 SYNCOPE AND COLLAPSE: ICD-10-CM

## 2025-07-07 PROCEDURE — 78452 HT MUSCLE IMAGE SPECT MULT: CPT | Performed by: INTERNAL MEDICINE

## 2025-07-07 PROCEDURE — 78452 HT MUSCLE IMAGE SPECT MULT: CPT

## 2025-07-07 PROCEDURE — 93016 CV STRESS TEST SUPVJ ONLY: CPT | Performed by: INTERNAL MEDICINE

## 2025-07-07 PROCEDURE — A9502 TC99M TETROFOSMIN: HCPCS | Performed by: INTERNAL MEDICINE

## 2025-07-07 PROCEDURE — 2500000004 HC RX 250 GENERAL PHARMACY W/ HCPCS (ALT 636 FOR OP/ED): Performed by: INTERNAL MEDICINE

## 2025-07-07 PROCEDURE — 3430000001 HC RX 343 DIAGNOSTIC RADIOPHARMACEUTICALS: Performed by: INTERNAL MEDICINE

## 2025-07-07 PROCEDURE — 93018 CV STRESS TEST I&R ONLY: CPT | Performed by: INTERNAL MEDICINE

## 2025-07-07 PROCEDURE — 93017 CV STRESS TEST TRACING ONLY: CPT

## 2025-07-07 RX ORDER — TROSPIUM CHLORIDE ER 60 MG/1
60 CAPSULE ORAL DAILY
Qty: 90 CAPSULE | Refills: 3 | Status: SHIPPED | OUTPATIENT
Start: 2025-07-07

## 2025-07-07 RX ORDER — REGADENOSON 0.08 MG/ML
0.4 INJECTION, SOLUTION INTRAVENOUS ONCE
Status: COMPLETED | OUTPATIENT
Start: 2025-07-07 | End: 2025-07-07

## 2025-07-07 RX ADMIN — TETROFOSMIN 11 MILLICURIE: 0.23 INJECTION, POWDER, LYOPHILIZED, FOR SOLUTION INTRAVENOUS at 12:05

## 2025-07-07 RX ADMIN — TETROFOSMIN 35.5 MILLICURIE: 0.23 INJECTION, POWDER, LYOPHILIZED, FOR SOLUTION INTRAVENOUS at 13:34

## 2025-07-07 RX ADMIN — REGADENOSON 0.4 MG: 0.08 INJECTION, SOLUTION INTRAVENOUS at 13:34

## 2025-07-08 ENCOUNTER — APPOINTMENT (OUTPATIENT)
Dept: CARDIOLOGY | Facility: CLINIC | Age: 85
End: 2025-07-08
Payer: MEDICARE

## 2025-07-09 ENCOUNTER — TELEPHONE (OUTPATIENT)
Dept: CARDIOLOGY | Facility: CLINIC | Age: 85
End: 2025-07-09
Payer: MEDICARE

## 2025-07-09 ENCOUNTER — PREP FOR PROCEDURE (OUTPATIENT)
Dept: CARDIOLOGY | Facility: CLINIC | Age: 85
End: 2025-07-09
Payer: MEDICARE

## 2025-07-09 DIAGNOSIS — I10 ESSENTIAL HYPERTENSION: ICD-10-CM

## 2025-07-09 DIAGNOSIS — I49.9 VENTRICULAR ARRHYTHMIA: ICD-10-CM

## 2025-07-09 DIAGNOSIS — I25.10 CORONARY ARTERY DISEASE INVOLVING NATIVE CORONARY ARTERY OF NATIVE HEART WITHOUT ANGINA PECTORIS: ICD-10-CM

## 2025-07-09 DIAGNOSIS — R94.39 ABNORMAL NUCLEAR STRESS TEST: Primary | ICD-10-CM

## 2025-07-09 NOTE — TELEPHONE ENCOUNTER
Patient last seen with Dr. Nate Arnett MD [facility] in June with new episodes of ventricular arrhythmia- nuclear done to rule out ischemia as etiology of arrhythmias.   Per Dr. Nate Arnett MD [facility], nuclear is abnormal- patient to be scheduled for [facility] in near future with him.     Routine preop lab work- orders placed.   Patient to HOLD his Eliquis x2 days pre procedure  Patient to HOLD Metformin AM of procedure  All other medications, including Aspirin, are fine to take day of procedure.     Orders placed and sent to provider for signature.      for patient to return call to discuss results  ----------------------------------------        Pre-Procedure Patient Information    You have been scheduled for:   At:   With:   Date of procedure:     1. Please have transportation to and from the hospital. While you should plan for same-day discharge there is a possibility you will need to stay overnight.    2. You will receive a call from the hospital 24 - 72 hours before your procedure providing you with fasting instructions, procedure location detail, and time of arrival.  If you have not received a call from the hospital by 6 pm the day before your scheduled procedure, please call [phone].    3. Please bring a current list of medications with you to the hospital.      4. Medications to hold:     - If you are on a blood thinner (like Eliquis and Xarelto), please hold for TWO  full days before procedure.     - If you are on aspirin, Plavix (Clopidogrel), Effient (Prasugrel), or Brilinta (Ticagrelor), please continue thru day of procedure.       - If you are diabetic on oral pills: please hold your morning oral diabetes medications (that includes metformin, glipizide).     - Otherwise, you may continue your medications in the morning with sips of water.     5. Nothing to eat after midnight before the procedure. OK to take morning medications, with above exceptions, the day of the procedure with a small sip of water.      6. Please bring to our attention if you have any contrast, latex or metal allergies.    7. Please have your blood work as instructed completed at least a day before your procedure.    8. If you have any questions, please contact the office at [phone].

## 2025-07-09 NOTE — TELEPHONE ENCOUNTER
----- Message from Nate Arnett sent at 7/8/2025  1:05 PM EDT -----  Abnormal stress test needs cardiac cath  ----- Message -----  From: Interface, Radiology Results In  Sent: 7/8/2025  12:32 PM EDT  To: Nate Arnett MD

## 2025-07-10 PROBLEM — R94.39 ABNORMAL NUCLEAR STRESS TEST: Status: ACTIVE | Noted: 2025-07-09

## 2025-07-10 PROBLEM — I49.9 VENTRICULAR ARRHYTHMIA: Status: ACTIVE | Noted: 2025-07-09

## 2025-07-14 ENCOUNTER — TELEPHONE (OUTPATIENT)
Dept: CARDIOLOGY | Facility: HOSPITAL | Age: 85
End: 2025-07-14
Payer: MEDICARE

## 2025-07-14 PROCEDURE — 85027 COMPLETE CBC AUTOMATED: CPT

## 2025-07-14 PROCEDURE — 85610 PROTHROMBIN TIME: CPT

## 2025-07-14 PROCEDURE — 80048 BASIC METABOLIC PNL TOTAL CA: CPT

## 2025-07-15 ENCOUNTER — APPOINTMENT (OUTPATIENT)
Dept: CARDIOLOGY | Facility: CLINIC | Age: 85
End: 2025-07-15
Payer: MEDICARE

## 2025-07-15 ENCOUNTER — APPOINTMENT (OUTPATIENT)
Dept: LAB | Facility: HOSPITAL | Age: 85
End: 2025-07-15
Payer: MEDICARE

## 2025-07-15 LAB
ANION GAP SERPL CALC-SCNC: 11 MMOL/L (ref 10–20)
BUN SERPL-MCNC: 28 MG/DL (ref 6–23)
CALCIUM SERPL-MCNC: 9.1 MG/DL (ref 8.6–10.3)
CHLORIDE SERPL-SCNC: 102 MMOL/L (ref 98–107)
CO2 SERPL-SCNC: 28 MMOL/L (ref 21–32)
CREAT SERPL-MCNC: 1.09 MG/DL (ref 0.5–1.3)
EGFRCR SERPLBLD CKD-EPI 2021: 67 ML/MIN/1.73M*2
ERYTHROCYTE [DISTWIDTH] IN BLOOD BY AUTOMATED COUNT: 13.3 % (ref 11.5–14.5)
GLUCOSE SERPL-MCNC: 170 MG/DL (ref 74–99)
HCT VFR BLD AUTO: 33.3 % (ref 41–52)
HGB BLD-MCNC: 10.2 G/DL (ref 13.5–17.5)
INR PPP: 1.8 (ref 0.9–1.1)
MCH RBC QN AUTO: 31.4 PG (ref 26–34)
MCHC RBC AUTO-ENTMCNC: 30.6 G/DL (ref 32–36)
MCV RBC AUTO: 103 FL (ref 80–100)
NRBC BLD-RTO: 0 /100 WBCS (ref 0–0)
PLATELET # BLD AUTO: 250 X10*3/UL (ref 150–450)
POTASSIUM SERPL-SCNC: 4.2 MMOL/L (ref 3.5–5.3)
PROTHROMBIN TIME: 19.6 SECONDS (ref 9.8–12.4)
RBC # BLD AUTO: 3.25 X10*6/UL (ref 4.5–5.9)
SODIUM SERPL-SCNC: 137 MMOL/L (ref 136–145)
WBC # BLD AUTO: 7.6 X10*3/UL (ref 4.4–11.3)

## 2025-07-15 NOTE — TELEPHONE ENCOUNTER
We do not have to reschedule this appointment.     Patients nuclear study came back abnormal and he is schedule for LHC with Dr. Nate Arnett MD [facility] next week.   Will follow up again after that. Thanks

## 2025-07-17 ENCOUNTER — APPOINTMENT (OUTPATIENT)
Dept: CARDIOLOGY | Facility: CLINIC | Age: 85
End: 2025-07-17
Payer: MEDICARE

## 2025-07-18 RX ORDER — ASPIRIN 325 MG
325 TABLET ORAL ONCE
Status: CANCELLED | OUTPATIENT
Start: 2025-07-18 | End: 2025-07-18

## 2025-07-21 ENCOUNTER — APPOINTMENT (OUTPATIENT)
Dept: CARDIOLOGY | Facility: HOSPITAL | Age: 85
End: 2025-07-21
Payer: MEDICARE

## 2025-07-21 ENCOUNTER — HOSPITAL ENCOUNTER (OUTPATIENT)
Facility: HOSPITAL | Age: 85
Setting detail: OUTPATIENT SURGERY
Discharge: HOME | End: 2025-07-21
Attending: INTERNAL MEDICINE | Admitting: INTERNAL MEDICINE
Payer: MEDICARE

## 2025-07-21 DIAGNOSIS — R94.39 ABNORMAL NUCLEAR STRESS TEST: ICD-10-CM

## 2025-07-21 DIAGNOSIS — I10 ESSENTIAL HYPERTENSION: ICD-10-CM

## 2025-07-21 DIAGNOSIS — I49.9 VENTRICULAR ARRHYTHMIA: Primary | ICD-10-CM

## 2025-07-21 DIAGNOSIS — I47.0 RE-ENTRY VENTRICULAR ARRHYTHMIA (MULTI): ICD-10-CM

## 2025-07-21 DIAGNOSIS — I25.10 CORONARY ARTERY DISEASE INVOLVING NATIVE CORONARY ARTERY OF NATIVE HEART WITHOUT ANGINA PECTORIS: ICD-10-CM

## 2025-07-21 PROCEDURE — 99152 MOD SED SAME PHYS/QHP 5/>YRS: CPT | Performed by: INTERNAL MEDICINE

## 2025-07-21 PROCEDURE — 7100000001 HC RECOVERY ROOM TIME - INITIAL BASE CHARGE: Performed by: INTERNAL MEDICINE

## 2025-07-21 PROCEDURE — 93458 L HRT ARTERY/VENTRICLE ANGIO: CPT | Performed by: INTERNAL MEDICINE

## 2025-07-21 PROCEDURE — 7100000009 HC PHASE TWO TIME - INITIAL BASE CHARGE: Performed by: INTERNAL MEDICINE

## 2025-07-21 PROCEDURE — 99024 POSTOP FOLLOW-UP VISIT: CPT | Performed by: NURSE PRACTITIONER

## 2025-07-21 PROCEDURE — 2550000001 HC RX 255 CONTRASTS: Performed by: INTERNAL MEDICINE

## 2025-07-21 PROCEDURE — 7100000002 HC RECOVERY ROOM TIME - EACH INCREMENTAL 1 MINUTE: Performed by: INTERNAL MEDICINE

## 2025-07-21 PROCEDURE — 2720000007 HC OR 272 NO HCPCS: Performed by: INTERNAL MEDICINE

## 2025-07-21 PROCEDURE — 2500000001 HC RX 250 WO HCPCS SELF ADMINISTERED DRUGS (ALT 637 FOR MEDICARE OP): Performed by: NURSE PRACTITIONER

## 2025-07-21 PROCEDURE — 2500000004 HC RX 250 GENERAL PHARMACY W/ HCPCS (ALT 636 FOR OP/ED): Performed by: INTERNAL MEDICINE

## 2025-07-21 PROCEDURE — 96373 THER/PROPH/DIAG INJ IA: CPT | Performed by: INTERNAL MEDICINE

## 2025-07-21 PROCEDURE — C1887 CATHETER, GUIDING: HCPCS | Performed by: INTERNAL MEDICINE

## 2025-07-21 PROCEDURE — 93005 ELECTROCARDIOGRAM TRACING: CPT

## 2025-07-21 PROCEDURE — C1760 CLOSURE DEV, VASC: HCPCS | Performed by: INTERNAL MEDICINE

## 2025-07-21 PROCEDURE — C1894 INTRO/SHEATH, NON-LASER: HCPCS | Performed by: INTERNAL MEDICINE

## 2025-07-21 PROCEDURE — 7100000010 HC PHASE TWO TIME - EACH INCREMENTAL 1 MINUTE: Performed by: INTERNAL MEDICINE

## 2025-07-21 RX ORDER — FENTANYL CITRATE 50 UG/ML
INJECTION, SOLUTION INTRAMUSCULAR; INTRAVENOUS AS NEEDED
Status: DISCONTINUED | OUTPATIENT
Start: 2025-07-21 | End: 2025-07-21 | Stop reason: HOSPADM

## 2025-07-21 RX ORDER — LIDOCAINE HYDROCHLORIDE 20 MG/ML
INJECTION, SOLUTION INFILTRATION; PERINEURAL AS NEEDED
Status: DISCONTINUED | OUTPATIENT
Start: 2025-07-21 | End: 2025-07-21 | Stop reason: HOSPADM

## 2025-07-21 RX ORDER — MIDAZOLAM HYDROCHLORIDE 2 MG/2ML
INJECTION, SOLUTION INTRAMUSCULAR; INTRAVENOUS AS NEEDED
Status: DISCONTINUED | OUTPATIENT
Start: 2025-07-21 | End: 2025-07-21 | Stop reason: HOSPADM

## 2025-07-21 RX ORDER — RANOLAZINE 500 MG/1
500 TABLET, EXTENDED RELEASE ORAL ONCE
Status: COMPLETED | OUTPATIENT
Start: 2025-07-21 | End: 2025-07-21

## 2025-07-21 RX ORDER — HEPARIN SODIUM 1000 [USP'U]/ML
INJECTION, SOLUTION INTRAVENOUS; SUBCUTANEOUS AS NEEDED
Status: DISCONTINUED | OUTPATIENT
Start: 2025-07-21 | End: 2025-07-21 | Stop reason: HOSPADM

## 2025-07-21 RX ORDER — NITROGLYCERIN 40 MG/100ML
INJECTION INTRAVENOUS AS NEEDED
Status: DISCONTINUED | OUTPATIENT
Start: 2025-07-21 | End: 2025-07-21 | Stop reason: HOSPADM

## 2025-07-21 RX ADMIN — RANOLAZINE 500 MG: 500 TABLET, FILM COATED, EXTENDED RELEASE ORAL at 09:26

## 2025-07-21 ASSESSMENT — PAIN SCALES - GENERAL

## 2025-07-21 ASSESSMENT — COLUMBIA-SUICIDE SEVERITY RATING SCALE - C-SSRS
1. IN THE PAST MONTH, HAVE YOU WISHED YOU WERE DEAD OR WISHED YOU COULD GO TO SLEEP AND NOT WAKE UP?: NO
6. HAVE YOU EVER DONE ANYTHING, STARTED TO DO ANYTHING, OR PREPARED TO DO ANYTHING TO END YOUR LIFE?: NO
2. HAVE YOU ACTUALLY HAD ANY THOUGHTS OF KILLING YOURSELF?: NO

## 2025-07-21 ASSESSMENT — PAIN - FUNCTIONAL ASSESSMENT: PAIN_FUNCTIONAL_ASSESSMENT: 0-10

## 2025-07-21 NOTE — Clinical Note
Patient ID band present and verified. Patient contact is in the lobby. Contact name: Lennie. Contact # : [phone].

## 2025-07-21 NOTE — NURSING NOTE
Reviewed AVS discharge paperwork with patient. All questions answered. Verbalizes understanding of activity restrictions, right radial care, medications and follow-up appointments.

## 2025-07-21 NOTE — Clinical Note
Fluid total post procedure: 20 mL [FreeTextEntry1] : Valve disease: clean coronaries on cardiac cath in 2014. status post AVR, MV repair Dr. Caio Louise 9/9/2014) SBE prophylaxis. okay to stop ASA week before dental extraction and implant. EKG with 1 PVC.\par \par PAF- changed from ASA to Eliquis by Dr ARIZMENDI.\par \par VT- has non-sustained, i spoke to Dr Palencia who does not want him to run the [facility]. There is  a VT and syncope/sudden death risk, also has a thoracic aneurysm.  I discussed this with him and his wife, walking the Marathon is OK but no jogging.  We walked it and did well. Now seeing Dr Jeff\par \par Aortic aneurysm: Enlarged. 42 (ascending) 47 (descending) in 2014\par Followup in 2015 reveals a max Ao of 46 mm\par CT angiogram 3/6/2017 aneurysm unchanged\par annual CT, due 3/2018.  Aorta 46mm April 2018, referred back to Dr Garcia, pt did not go as of 1/2019\par \par HLD: on pravastatin 40mg, had muscle aches on 80mg,  8/2017, pt reported he was only taking it once in a while, now taking it daily will repeat lipid panel. Diet and exercise discussed in detail.   Feb 2018\par \par HTN: Have DC Lasix and changed to HCTZ weekly.  BP very good, Reduce Micardis  40 for persistent elevated K.  Increase HCTZ 25 to M & F. \par \par CKD: 1.33 8/8/2017, repeat BMP Sept 2017, Cr 1.05, previously normal, on ARB .  K better

## 2025-07-21 NOTE — POST-PROCEDURE NOTE
Physician Transition of Care Summary  Invasive Cardiovascular Lab    Procedure Date: 7/21/2025  Attending:    * Nate Arnett - Primary  Resident/Fellow/Other Assistant: Surgeons and Role:  * No surgeons found with a matching role *    Indications:   Pre-op Diagnosis      * Ventricular arrhythmia [I49.9]     * Abnormal nuclear stress test [R94.39]     * Essential hypertension [I10]     * Coronary artery disease involving native coronary artery of native heart without angina pectoris [I25.10]    Post-procedure diagnosis:   Post-op Diagnosis     * Ventricular arrhythmia [I49.9]     * Abnormal nuclear stress test [R94.39]     * Essential hypertension [I10]     * Coronary artery disease involving native coronary artery of native heart without angina pectoris [I25.10]    Procedure(s):   LHC, With LV  50840 - CT CATH PLMT L HRT & ARTS W/NJX & ANGIO IMG S&I        Procedure Findings:   Mild cor disease, normal left ventricular function    Description of the Procedure:   Left heart catheterization cholangiography left ventricular    Complications:   None    Stents/Implants:   Implants       No implant documentation for this case.            Anticoagulation/Antiplatelet Plan:   Intravenous heparin    Estimated Blood Loss:   * No values recorded between 7/21/2025 11:31 AM and 7/21/2025 12:09 PM *    Anesthesia: Moderate Sedation Anesthesia Staff: No anesthesia staff entered.    Any Specimen(s) Removed:   No specimens collected during this procedure.    Disposition:   Medical therapy      Electronically signed by: Nate Arnett MD, 7/21/2025 12:09 PM

## 2025-07-21 NOTE — PROGRESS NOTES
Patient is stable status post C under the care of Dr. Arnett.  Discussed results of procedure with patient and his wife.  Pictures provided.  Findings of the LHC revealed mild coronary artery disease and a normal LVEF.  Medical management is advised.  Patient is scheduled for an evaluation for possible Watchman device with Dr. Carlos next month.  He was encouraged to keep this appointment.  He should follow-up with Dr. Arnett per routine.  Postprocedural activity, restrictions, potential complications, medications and future follow-up discussed at length.  All questions answered.  Patient verbalized an understanding.

## 2025-07-21 NOTE — DISCHARGE INSTRUCTIONS
CARDIAC CATHETERIZATION DISCHARGE INSTRUCTIONS     FOR SUDDEN AND SEVERE CHEST PAIN, SHORTNESS OF BREATH, EXCESSIVE BLEEDING, SIGNS OF STROKE, OR CHANGES IN MENTAL STATUS YOU SHOULD CALL 911 IMMEDIATELY.       FOR NEXT 24 HOURS  - Upon discharge, you should return home and rest for the remainder of the day and evening. You do not have to stay on bed rest but should not be very active.  It is recommended a responsible adult be with you for the first 24 hours after the procedure.    - No driving for 24 hours after procedure. Please arrange for someone to drive you home from the hospital today.     - Do not drive, operate machinery, or use power tools for 24 hours after your procedure.     - Do not make any legal decisions for 24 hours after your procedure.     - Do not drink alcoholic beverages for 24 hours after your procedure.    WOUND CARE   *FOR RADIAL (WRIST) ACCESS*  ·      No lifting more than 5 pounds or excessive use of the wrist for 3-4 days - for example, treat your wrist as if it is sprained.  ·      Do not engage in vigorous activities (tennis, golf, bowling, weights) for at least 3-4 days after the procedure.  ·      Do not submerge the wrist for 7 days after the procedure.  ·      You should expect mild tingling in your hand and tenderness at the puncture site for up to 3 days.    - The transparent dressing should be removed from the site 24 hours after the procedure.  Wash the site gently with soap and water. Rinse well and pat dry. Keep the area clean and dry. You may apply a Band-Aid to the site. Avoid lotions, ointments, or powders until fully healed.     - You may shower the day after your procedure.      - It is normal to notice a small bruise around the puncture site and/or a small grape sized or smaller lump. Any large bruising or large lump warrants a call to the office.     - If bleeding should occur, apply pressure to the affected area for 10 minutes.  If the bleeding stops notify  your physician.  If there is a large amount of bleeding or spurting of blood CALL 911 immediately.  DO NOT drive yourself to the hospital.    - You may experience some tenderness, bruising or minimal inflammation.  If you have any concerns, you may contact the Cath Lab or if any of these symptoms become excessive, contact your cardiologist or go to the emergency room.     OTHER INSTRUCTIONS  - You may take acetaminophen (Tylenol) as directed for discomfort.  If pain is not relieved with acetaminophen (Tylenol), contact your doctor.    - If you notice or experience any of the following, you should notify your doctor or seek medical attention  Chest pain or discomfort  Change in mental status or weakness in extremities.  Dizziness, light headedness, or feeling faint.  Change in the site where the procedure was performed, such as bleeding or an increased area of bruising or swelling.  Tingling, numbness, pain, or coolness in the leg/arm beyond the site where the procedure was performed.  Signs of infection (i.e. shaking chills, temperature > 100 degrees Fahrenheit, warmth, redness) in the leg/arm area where the procedure was performed.  Changes in urination   Bloody or black stools  Vomiting blood  Severe nose bleeds  Any excessive bleeding    - If you DO NOT have an appointment with your cardiologist within 2-4 weeks following your procedure, please contact their office.

## 2025-07-22 ENCOUNTER — APPOINTMENT (OUTPATIENT)
Dept: CARDIOLOGY | Facility: CLINIC | Age: 85
End: 2025-07-22
Payer: MEDICARE

## 2025-07-22 VITALS
OXYGEN SATURATION: 100 % | SYSTOLIC BLOOD PRESSURE: 111 MMHG | TEMPERATURE: 36.2 F | BODY MASS INDEX: 25.36 KG/M2 | WEIGHT: 167.33 LBS | RESPIRATION RATE: 18 BRPM | HEIGHT: 68 IN | DIASTOLIC BLOOD PRESSURE: 66 MMHG | HEART RATE: 71 BPM

## 2025-07-26 LAB
Q ONSET: 212 MS
QRS COUNT: 12 BEATS
QRS DURATION: 92 MS
QT INTERVAL: 388 MS
QTC CALCULATION(BAZETT): 436 MS
QTC FREDERICIA: 419 MS
R AXIS: -3 DEGREES
T AXIS: 4 DEGREES
T OFFSET: 406 MS
VENTRICULAR RATE: 76 BPM

## 2025-08-13 ENCOUNTER — OFFICE VISIT (OUTPATIENT)
Dept: CARDIOLOGY | Facility: CLINIC | Age: 85
End: 2025-08-13
Payer: MEDICARE

## 2025-08-13 VITALS
HEART RATE: 89 BPM | WEIGHT: 170 LBS | SYSTOLIC BLOOD PRESSURE: 125 MMHG | OXYGEN SATURATION: 99 % | RESPIRATION RATE: 16 BRPM | TEMPERATURE: 96.9 F | BODY MASS INDEX: 25.76 KG/M2 | HEIGHT: 68 IN | DIASTOLIC BLOOD PRESSURE: 69 MMHG

## 2025-08-13 DIAGNOSIS — I48.19 PERSISTENT ATRIAL FIBRILLATION (MULTI): Primary | ICD-10-CM

## 2025-08-13 DIAGNOSIS — I48.91 ATRIAL FIBRILLATION, UNSPECIFIED TYPE (MULTI): Primary | ICD-10-CM

## 2025-08-13 DIAGNOSIS — Z00.6 ENCOUNTER FOR EXAMINATION FOR NORMAL COMPARISON AND CONTROL IN CLINICAL RESEARCH PROGRAM: ICD-10-CM

## 2025-08-13 PROCEDURE — 3074F SYST BP LT 130 MM HG: CPT | Performed by: INTERNAL MEDICINE

## 2025-08-13 PROCEDURE — 99204 OFFICE O/P NEW MOD 45 MIN: CPT | Performed by: INTERNAL MEDICINE

## 2025-08-13 PROCEDURE — 1036F TOBACCO NON-USER: CPT | Performed by: INTERNAL MEDICINE

## 2025-08-13 PROCEDURE — 3078F DIAST BP <80 MM HG: CPT | Performed by: INTERNAL MEDICINE

## 2025-08-13 PROCEDURE — 1159F MED LIST DOCD IN RCRD: CPT | Performed by: INTERNAL MEDICINE

## 2025-08-20 ENCOUNTER — APPOINTMENT (OUTPATIENT)
Dept: PRIMARY CARE | Facility: CLINIC | Age: 85
End: 2025-08-20
Payer: MEDICARE

## 2025-08-20 VITALS
SYSTOLIC BLOOD PRESSURE: 110 MMHG | OXYGEN SATURATION: 98 % | RESPIRATION RATE: 16 BRPM | HEIGHT: 68 IN | HEART RATE: 68 BPM | WEIGHT: 168 LBS | DIASTOLIC BLOOD PRESSURE: 72 MMHG | TEMPERATURE: 97.9 F | BODY MASS INDEX: 25.46 KG/M2

## 2025-08-20 DIAGNOSIS — I48.21 PERMANENT ATRIAL FIBRILLATION (MULTI): ICD-10-CM

## 2025-08-20 DIAGNOSIS — I10 ESSENTIAL HYPERTENSION: ICD-10-CM

## 2025-08-20 DIAGNOSIS — E78.2 MIXED HYPERLIPIDEMIA: ICD-10-CM

## 2025-08-20 DIAGNOSIS — E55.9 VITAMIN D DEFICIENCY: ICD-10-CM

## 2025-08-20 DIAGNOSIS — E53.8 VITAMIN B12 DEFICIENCY: ICD-10-CM

## 2025-08-20 DIAGNOSIS — E11.9 TYPE 2 DIABETES MELLITUS WITHOUT COMPLICATION, UNSPECIFIED WHETHER LONG TERM INSULIN USE: Primary | ICD-10-CM

## 2025-08-20 DIAGNOSIS — R80.9: ICD-10-CM

## 2025-08-20 DIAGNOSIS — M05.79 RHEUMATOID ARTHRITIS INVOLVING MULTIPLE SITES WITH POSITIVE RHEUMATOID FACTOR (MULTI): ICD-10-CM

## 2025-08-20 DIAGNOSIS — E08.29: ICD-10-CM

## 2025-08-20 DIAGNOSIS — I25.10 CORONARY ARTERY DISEASE INVOLVING NATIVE CORONARY ARTERY OF NATIVE HEART WITHOUT ANGINA PECTORIS: ICD-10-CM

## 2025-08-20 DIAGNOSIS — M54.2 NECK PAIN: ICD-10-CM

## 2025-08-20 DIAGNOSIS — I73.9 PVD (PERIPHERAL VASCULAR DISEASE): ICD-10-CM

## 2025-08-20 DIAGNOSIS — Z00.00 HEALTHCARE MAINTENANCE: ICD-10-CM

## 2025-08-20 PROBLEM — Z00.6 ENCOUNTER FOR EXAMINATION FOR NORMAL COMPARISON AND CONTROL IN CLINICAL RESEARCH PROGRAM: Status: RESOLVED | Noted: 2025-08-13 | Resolved: 2025-08-20

## 2025-08-20 LAB
APPEARANCE UR: CLEAR
BACTERIA #/AREA URNS HPF: ABNORMAL /HPF
BACTERIA UR CULT: NORMAL
BILIRUB UR QL STRIP: NEGATIVE
COLOR UR: YELLOW
GLUCOSE UR QL STRIP: NEGATIVE
HGB UR QL STRIP: NEGATIVE
HYALINE CASTS #/AREA URNS LPF: ABNORMAL /LPF
KETONES UR QL STRIP: NEGATIVE
LEUKOCYTE ESTERASE UR QL STRIP: ABNORMAL
NITRITE UR QL STRIP: NEGATIVE
PH UR STRIP: 7.5 [PH] (ref 5–8)
PROT UR QL STRIP: NEGATIVE
PSA SERPL-MCNC: 0.34 NG/ML
RBC #/AREA URNS HPF: ABNORMAL /HPF
SERVICE CMNT-IMP: ABNORMAL
SP GR UR STRIP: 1.01 (ref 1–1.03)
SQUAMOUS #/AREA URNS HPF: ABNORMAL /HPF
WBC #/AREA URNS HPF: ABNORMAL /HPF

## 2025-08-20 PROCEDURE — 1170F FXNL STATUS ASSESSED: CPT | Performed by: INTERNAL MEDICINE

## 2025-08-20 PROCEDURE — 1159F MED LIST DOCD IN RCRD: CPT | Performed by: INTERNAL MEDICINE

## 2025-08-20 PROCEDURE — 99214 OFFICE O/P EST MOD 30 MIN: CPT | Performed by: INTERNAL MEDICINE

## 2025-08-20 PROCEDURE — 1036F TOBACCO NON-USER: CPT | Performed by: INTERNAL MEDICINE

## 2025-08-20 PROCEDURE — 3074F SYST BP LT 130 MM HG: CPT | Performed by: INTERNAL MEDICINE

## 2025-08-20 PROCEDURE — 3078F DIAST BP <80 MM HG: CPT | Performed by: INTERNAL MEDICINE

## 2025-08-20 PROCEDURE — G0439 PPPS, SUBSEQ VISIT: HCPCS | Performed by: INTERNAL MEDICINE

## 2025-08-20 PROCEDURE — 1160F RVW MEDS BY RX/DR IN RCRD: CPT | Performed by: INTERNAL MEDICINE

## 2025-08-20 ASSESSMENT — ENCOUNTER SYMPTOMS
COUGH: 0
FATIGUE: 0
LOSS OF SENSATION IN FEET: 0
SHORTNESS OF BREATH: 0
DEPRESSION: 0
OCCASIONAL FEELINGS OF UNSTEADINESS: 1
FEVER: 0

## 2025-08-20 ASSESSMENT — ACTIVITIES OF DAILY LIVING (ADL)
BATHING: INDEPENDENT
DRESSING: INDEPENDENT
GROCERY_SHOPPING: INDEPENDENT
DOING_HOUSEWORK: INDEPENDENT
TAKING_MEDICATION: INDEPENDENT
MANAGING_FINANCES: INDEPENDENT

## 2025-08-22 ENCOUNTER — HOSPITAL ENCOUNTER (OUTPATIENT)
Dept: RADIOLOGY | Facility: HOSPITAL | Age: 85
Discharge: HOME | End: 2025-08-22
Payer: MEDICARE

## 2025-08-22 DIAGNOSIS — M54.2 NECK PAIN: ICD-10-CM

## 2025-08-22 PROCEDURE — 72050 X-RAY EXAM NECK SPINE 4/5VWS: CPT

## 2025-08-25 ENCOUNTER — APPOINTMENT (OUTPATIENT)
Dept: UROLOGY | Facility: CLINIC | Age: 85
End: 2025-08-25
Payer: MEDICARE

## 2025-08-25 VITALS
DIASTOLIC BLOOD PRESSURE: 68 MMHG | WEIGHT: 171.3 LBS | HEIGHT: 68 IN | BODY MASS INDEX: 25.96 KG/M2 | SYSTOLIC BLOOD PRESSURE: 121 MMHG | RESPIRATION RATE: 16 BRPM | HEART RATE: 96 BPM

## 2025-08-25 DIAGNOSIS — Z12.5 SCREENING FOR PROSTATE CANCER: ICD-10-CM

## 2025-08-25 DIAGNOSIS — R30.0 DYSURIA: ICD-10-CM

## 2025-08-25 DIAGNOSIS — N40.0 BENIGN PROSTATIC HYPERPLASIA, UNSPECIFIED WHETHER LOWER URINARY TRACT SYMPTOMS PRESENT: ICD-10-CM

## 2025-08-25 DIAGNOSIS — N32.89 BLADDER INSTABILITY: ICD-10-CM

## 2025-08-25 DIAGNOSIS — R39.15 URINARY URGENCY: ICD-10-CM

## 2025-08-25 DIAGNOSIS — N40.1 BENIGN PROSTATIC HYPERPLASIA WITH LOWER URINARY TRACT SYMPTOMS, SYMPTOM DETAILS UNSPECIFIED: Primary | ICD-10-CM

## 2025-08-25 PROCEDURE — 1160F RVW MEDS BY RX/DR IN RCRD: CPT | Performed by: UROLOGY

## 2025-08-25 PROCEDURE — 99213 OFFICE O/P EST LOW 20 MIN: CPT | Performed by: UROLOGY

## 2025-08-25 PROCEDURE — 1126F AMNT PAIN NOTED NONE PRSNT: CPT | Performed by: UROLOGY

## 2025-08-25 PROCEDURE — 1036F TOBACCO NON-USER: CPT | Performed by: UROLOGY

## 2025-08-25 PROCEDURE — G2211 COMPLEX E/M VISIT ADD ON: HCPCS | Performed by: UROLOGY

## 2025-08-25 PROCEDURE — 3078F DIAST BP <80 MM HG: CPT | Performed by: UROLOGY

## 2025-08-25 PROCEDURE — 3074F SYST BP LT 130 MM HG: CPT | Performed by: UROLOGY

## 2025-08-25 PROCEDURE — 1159F MED LIST DOCD IN RCRD: CPT | Performed by: UROLOGY

## 2025-08-25 RX ORDER — TAMSULOSIN HYDROCHLORIDE 0.4 MG/1
0.4 CAPSULE ORAL DAILY
Qty: 90 CAPSULE | Refills: 3 | Status: SHIPPED | OUTPATIENT
Start: 2025-08-25 | End: 2026-08-25

## 2025-08-25 RX ORDER — FINASTERIDE 5 MG/1
5 TABLET, FILM COATED ORAL DAILY
Qty: 90 TABLET | Refills: 3 | Status: SHIPPED | OUTPATIENT
Start: 2025-08-25

## 2025-08-25 RX ORDER — TROSPIUM CHLORIDE ER 60 MG/1
60 CAPSULE ORAL DAILY
Qty: 90 CAPSULE | Refills: 3 | Status: SHIPPED | OUTPATIENT
Start: 2025-08-25

## 2025-08-25 ASSESSMENT — ENCOUNTER SYMPTOMS
DIFFICULTY URINATING: 0
DYSURIA: 0
FREQUENCY: 1
HEMATURIA: 0

## 2025-08-25 ASSESSMENT — PAIN SCALES - GENERAL: PAINLEVEL_OUTOF10: 0-NO PAIN

## 2025-08-27 ENCOUNTER — RESULTS FOLLOW-UP (OUTPATIENT)
Dept: PRIMARY CARE | Facility: CLINIC | Age: 85
End: 2025-08-27
Payer: MEDICARE

## 2025-08-27 DIAGNOSIS — R22.1 NECK MASS: Primary | ICD-10-CM

## 2025-08-30 LAB
25(OH)D3+25(OH)D2 SERPL-MCNC: 81 NG/ML (ref 30–100)
ALBUMIN SERPL-MCNC: 3.9 G/DL (ref 3.6–5.1)
ALP SERPL-CCNC: 96 U/L (ref 35–144)
ALT SERPL-CCNC: 12 U/L (ref 9–46)
ANION GAP SERPL CALCULATED.4IONS-SCNC: 10 MMOL/L (CALC) (ref 7–17)
AST SERPL-CCNC: 15 U/L (ref 10–35)
BILIRUB SERPL-MCNC: 0.5 MG/DL (ref 0.2–1.2)
BUN SERPL-MCNC: 31 MG/DL (ref 7–25)
CALCIUM SERPL-MCNC: 9 MG/DL (ref 8.6–10.3)
CHLORIDE SERPL-SCNC: 103 MMOL/L (ref 98–110)
CHOLEST SERPL-MCNC: 98 MG/DL
CHOLEST/HDLC SERPL: 2.3 (CALC)
CO2 SERPL-SCNC: 28 MMOL/L (ref 20–32)
CREAT SERPL-MCNC: 1.13 MG/DL (ref 0.7–1.22)
EGFRCR SERPLBLD CKD-EPI 2021: 64 ML/MIN/1.73M2
ERYTHROCYTE [DISTWIDTH] IN BLOOD BY AUTOMATED COUNT: 11.5 % (ref 11–15)
EST. AVERAGE GLUCOSE BLD GHB EST-MCNC: 137 MG/DL
EST. AVERAGE GLUCOSE BLD GHB EST-SCNC: 7.6 MMOL/L
GLUCOSE SERPL-MCNC: 120 MG/DL (ref 65–99)
HBA1C MFR BLD: 6.4 %
HCT VFR BLD AUTO: 35 % (ref 38.5–50)
HDLC SERPL-MCNC: 43 MG/DL
HGB BLD-MCNC: 11.3 G/DL (ref 13.2–17.1)
LDLC SERPL CALC-MCNC: 41 MG/DL (CALC)
MCH RBC QN AUTO: 31.3 PG (ref 27–33)
MCHC RBC AUTO-ENTMCNC: 32.3 G/DL (ref 32–36)
MCV RBC AUTO: 97 FL (ref 80–100)
NONHDLC SERPL-MCNC: 55 MG/DL (CALC)
PLATELET # BLD AUTO: 247 THOUSAND/UL (ref 140–400)
PMV BLD REES-ECKER: 9.6 FL (ref 7.5–12.5)
POTASSIUM SERPL-SCNC: 4.4 MMOL/L (ref 3.5–5.3)
PROT SERPL-MCNC: 6.4 G/DL (ref 6.1–8.1)
RBC # BLD AUTO: 3.61 MILLION/UL (ref 4.2–5.8)
SODIUM SERPL-SCNC: 141 MMOL/L (ref 135–146)
TRIGL SERPL-MCNC: 68 MG/DL
TSH SERPL-ACNC: 1.47 MIU/L (ref 0.4–4.5)
VIT B12 SERPL-MCNC: 734 PG/ML (ref 200–1100)
WBC # BLD AUTO: 7.9 THOUSAND/UL (ref 3.8–10.8)

## 2025-09-04 ENCOUNTER — HOSPITAL ENCOUNTER (OUTPATIENT)
Dept: RADIOLOGY | Facility: HOSPITAL | Age: 85
Discharge: HOME | End: 2025-09-04
Payer: MEDICARE

## 2025-09-04 ENCOUNTER — RESULTS FOLLOW-UP (OUTPATIENT)
Dept: PRIMARY CARE | Facility: CLINIC | Age: 85
End: 2025-09-04
Payer: MEDICARE

## 2025-09-04 DIAGNOSIS — R22.1 NECK MASS: ICD-10-CM

## 2025-09-04 PROCEDURE — 2550000001 HC RX 255 CONTRASTS: Performed by: INTERNAL MEDICINE

## 2025-09-04 PROCEDURE — 70491 CT SOFT TISSUE NECK W/DYE: CPT | Performed by: RADIOLOGY

## 2025-09-04 PROCEDURE — 70491 CT SOFT TISSUE NECK W/DYE: CPT

## 2025-09-04 RX ADMIN — IOHEXOL 75 ML: 350 INJECTION, SOLUTION INTRAVENOUS at 08:08

## 2025-09-05 ENCOUNTER — RESULTS FOLLOW-UP (OUTPATIENT)
Dept: PRIMARY CARE | Facility: CLINIC | Age: 85
End: 2025-09-05
Payer: MEDICARE

## 2025-09-22 ENCOUNTER — APPOINTMENT (OUTPATIENT)
Dept: OTOLARYNGOLOGY | Facility: CLINIC | Age: 85
End: 2025-09-22
Payer: MEDICARE

## 2025-10-14 ENCOUNTER — APPOINTMENT (OUTPATIENT)
Dept: CARDIOLOGY | Facility: CLINIC | Age: 85
End: 2025-10-14
Payer: MEDICARE

## 2025-12-19 ENCOUNTER — APPOINTMENT (OUTPATIENT)
Dept: PRIMARY CARE | Facility: CLINIC | Age: 85
End: 2025-12-19
Payer: MEDICARE

## 2026-08-24 ENCOUNTER — APPOINTMENT (OUTPATIENT)
Dept: UROLOGY | Facility: CLINIC | Age: 86
End: 2026-08-24
Payer: MEDICARE

## (undated) DEVICE — TR BAND, RADIAL COMPRESSION, STANDARD, 24CM

## (undated) DEVICE — KIT, RADIAL, CUSTOM, ELYRIA

## (undated) DEVICE — CATHETER, OPTITORQUE, 5FR, JACKY, 3.5/ 2H/110CM, CURVED

## (undated) DEVICE — TUBING, MANIFOLD, LOW PRESSURE

## (undated) DEVICE — TR BAND, RADIAL COMPRESSION, LONG, 29CM

## (undated) DEVICE — SHEATH, GLIDESHEATH, SLENDER, 6FR 10CM